# Patient Record
Sex: FEMALE | Race: BLACK OR AFRICAN AMERICAN | NOT HISPANIC OR LATINO | Employment: OTHER | URBAN - METROPOLITAN AREA
[De-identification: names, ages, dates, MRNs, and addresses within clinical notes are randomized per-mention and may not be internally consistent; named-entity substitution may affect disease eponyms.]

---

## 2017-07-26 PROBLEM — K13.79 PAIN IN ORAL CAVITY: Status: ACTIVE | Noted: 2017-07-26

## 2017-07-28 PROBLEM — G89.29 CHRONIC PAIN: Status: ACTIVE | Noted: 2017-07-28

## 2017-07-28 PROBLEM — Z79.4 TYPE 2 DIABETES MELLITUS WITH COMPLICATION, WITH LONG-TERM CURRENT USE OF INSULIN: Status: ACTIVE | Noted: 2017-07-28

## 2017-07-28 PROBLEM — I10 ESSENTIAL HYPERTENSION: Status: ACTIVE | Noted: 2017-07-28

## 2017-07-28 PROBLEM — I25.2 HISTORY OF MI (MYOCARDIAL INFARCTION): Status: ACTIVE | Noted: 2017-07-28

## 2017-07-28 PROBLEM — E11.8 TYPE 2 DIABETES MELLITUS WITH COMPLICATION, WITH LONG-TERM CURRENT USE OF INSULIN: Status: ACTIVE | Noted: 2017-07-28

## 2017-09-07 PROBLEM — E78.5 HYPERLIPIDEMIA: Status: ACTIVE | Noted: 2017-09-07

## 2017-09-07 PROBLEM — N18.30 CKD (CHRONIC KIDNEY DISEASE) STAGE 3, GFR 30-59 ML/MIN: Status: ACTIVE | Noted: 2017-09-07

## 2017-09-07 PROBLEM — E87.6 HYPOKALEMIA: Status: ACTIVE | Noted: 2017-09-07

## 2017-09-16 ENCOUNTER — NURSE TRIAGE (OUTPATIENT)
Dept: ADMINISTRATIVE | Facility: CLINIC | Age: 62
End: 2017-09-16

## 2017-09-16 NOTE — TELEPHONE ENCOUNTER
"    Reason for Disposition   Patient sounds very sick or weak to the triager    Answer Assessment - Initial Assessment Questions  1. ONSET: "When did the pain start?"       Monday   2. LOCATION: "Where is the pain located?"       Left ankle  3. PAIN: "How bad is the pain?"    (Scale 1-10; or mild, moderate, severe)   - MILD (1-3): doesn't interfere with normal activities    - MODERATE (4-7): interferes with normal activities (e.g., work or school) or awakens from sleep, limping    - SEVERE (8-10): excruciating pain, unable to do any normal activities, unable to walk       7/10  4. WORK OR EXERCISE: "Has there been any recent work or exercise that involved this part of the body?"       Fall   5. CAUSE: "What do you think is causing the ankle pain?"      Fall 2 days ago  6. OTHER SYMPTOMS: "Do you have any other symptoms?" (e.g., calf pain, rash, fever, swelling)      Swelling of the left ankle  7. PREGNANCY: "Is there any chance you are pregnant?" "When was your last menstrual period?"      N/a    Protocols used: ST ANKLE PAIN-A-AH    Patient fell Monday past and today she is experiencing moderate to severe pain of the left ankle. She states she went to the ED on Monday and they told her it was not broken and to rest it and they wrapped it. Patient states she has been walking on it and it is very swollen. Patient advised to call someone and have them to bring her to the ED for an evaluation. Patient states she will have to find a ride but unsure if she will go. Patient advised to f/u with Dr. Torres as instructed by the ED as she reports, but she states she could not get an appt until next month. Patient informed a message would be sent to Dr. Torres to see if she could be seen earlier. Patient verbalized understanding.   "

## 2018-04-25 ENCOUNTER — TELEPHONE (OUTPATIENT)
Dept: ADMINISTRATIVE | Facility: HOSPITAL | Age: 63
End: 2018-04-25

## 2020-07-02 ENCOUNTER — OFFICE VISIT (OUTPATIENT)
Dept: INTERNAL MEDICINE | Facility: CLINIC | Age: 65
End: 2020-07-02
Payer: MEDICARE

## 2020-07-02 ENCOUNTER — TELEPHONE (OUTPATIENT)
Dept: INTERNAL MEDICINE | Facility: CLINIC | Age: 65
End: 2020-07-02

## 2020-07-02 ENCOUNTER — LAB VISIT (OUTPATIENT)
Dept: LAB | Facility: HOSPITAL | Age: 65
End: 2020-07-02
Attending: FAMILY MEDICINE
Payer: MEDICARE

## 2020-07-02 VITALS
HEART RATE: 86 BPM | DIASTOLIC BLOOD PRESSURE: 80 MMHG | BODY MASS INDEX: 41.46 KG/M2 | HEIGHT: 67 IN | WEIGHT: 264.13 LBS | SYSTOLIC BLOOD PRESSURE: 139 MMHG | TEMPERATURE: 98 F

## 2020-07-02 DIAGNOSIS — E78.5 DYSLIPIDEMIA ASSOCIATED WITH TYPE 2 DIABETES MELLITUS: ICD-10-CM

## 2020-07-02 DIAGNOSIS — E55.9 VITAMIN D INSUFFICIENCY: ICD-10-CM

## 2020-07-02 DIAGNOSIS — I50.9 CONGESTIVE HEART FAILURE, UNSPECIFIED HF CHRONICITY, UNSPECIFIED HEART FAILURE TYPE: ICD-10-CM

## 2020-07-02 DIAGNOSIS — I25.10 CORONARY ARTERY DISEASE, ANGINA PRESENCE UNSPECIFIED, UNSPECIFIED VESSEL OR LESION TYPE, UNSPECIFIED WHETHER NATIVE OR TRANSPLANTED HEART: ICD-10-CM

## 2020-07-02 DIAGNOSIS — J44.9 CHRONIC OBSTRUCTIVE PULMONARY DISEASE, UNSPECIFIED COPD TYPE: ICD-10-CM

## 2020-07-02 DIAGNOSIS — Z79.4 TYPE 2 DIABETES MELLITUS WITH COMPLICATION, WITH LONG-TERM CURRENT USE OF INSULIN: ICD-10-CM

## 2020-07-02 DIAGNOSIS — E11.69 DYSLIPIDEMIA ASSOCIATED WITH TYPE 2 DIABETES MELLITUS: ICD-10-CM

## 2020-07-02 DIAGNOSIS — R35.0 URINARY FREQUENCY: ICD-10-CM

## 2020-07-02 DIAGNOSIS — E11.8 TYPE 2 DIABETES MELLITUS WITH COMPLICATION, WITH LONG-TERM CURRENT USE OF INSULIN: ICD-10-CM

## 2020-07-02 DIAGNOSIS — Z79.4 TYPE 2 DIABETES MELLITUS WITH COMPLICATION, WITH LONG-TERM CURRENT USE OF INSULIN: Primary | ICD-10-CM

## 2020-07-02 DIAGNOSIS — I10 HYPERTENSION, UNSPECIFIED TYPE: ICD-10-CM

## 2020-07-02 DIAGNOSIS — N18.30 CKD (CHRONIC KIDNEY DISEASE) STAGE 3, GFR 30-59 ML/MIN: ICD-10-CM

## 2020-07-02 DIAGNOSIS — E66.01 MORBID OBESITY: ICD-10-CM

## 2020-07-02 DIAGNOSIS — G89.29 CHRONIC BACK PAIN, UNSPECIFIED BACK LOCATION, UNSPECIFIED BACK PAIN LATERALITY: ICD-10-CM

## 2020-07-02 DIAGNOSIS — G89.29 OTHER CHRONIC PAIN: ICD-10-CM

## 2020-07-02 DIAGNOSIS — M54.9 CHRONIC BACK PAIN, UNSPECIFIED BACK LOCATION, UNSPECIFIED BACK PAIN LATERALITY: ICD-10-CM

## 2020-07-02 DIAGNOSIS — E11.8 TYPE 2 DIABETES MELLITUS WITH COMPLICATION, WITH LONG-TERM CURRENT USE OF INSULIN: Primary | ICD-10-CM

## 2020-07-02 DIAGNOSIS — J44.9 CHRONIC OBSTRUCTIVE PULMONARY DISEASE, UNSPECIFIED COPD TYPE: Primary | ICD-10-CM

## 2020-07-02 LAB
ALBUMIN SERPL BCP-MCNC: 3.6 G/DL (ref 3.5–5.2)
ALP SERPL-CCNC: 69 U/L (ref 55–135)
ALT SERPL W/O P-5'-P-CCNC: 10 U/L (ref 10–44)
ANION GAP SERPL CALC-SCNC: 10 MMOL/L (ref 8–16)
AST SERPL-CCNC: 19 U/L (ref 10–40)
BASOPHILS # BLD AUTO: 0.06 K/UL (ref 0–0.2)
BASOPHILS NFR BLD: 0.5 % (ref 0–1.9)
BILIRUB SERPL-MCNC: 0.6 MG/DL (ref 0.1–1)
BUN SERPL-MCNC: 21 MG/DL (ref 8–23)
CALCIUM SERPL-MCNC: 10.1 MG/DL (ref 8.7–10.5)
CHLORIDE SERPL-SCNC: 99 MMOL/L (ref 95–110)
CO2 SERPL-SCNC: 28 MMOL/L (ref 23–29)
CREAT SERPL-MCNC: 1.1 MG/DL (ref 0.5–1.4)
DIFFERENTIAL METHOD: ABNORMAL
EOSINOPHIL # BLD AUTO: 0.5 K/UL (ref 0–0.5)
EOSINOPHIL NFR BLD: 4.3 % (ref 0–8)
ERYTHROCYTE [DISTWIDTH] IN BLOOD BY AUTOMATED COUNT: 13.2 % (ref 11.5–14.5)
EST. GFR  (AFRICAN AMERICAN): >60 ML/MIN/1.73 M^2
EST. GFR  (NON AFRICAN AMERICAN): 53.2 ML/MIN/1.73 M^2
GLUCOSE SERPL-MCNC: 87 MG/DL (ref 70–110)
HCT VFR BLD AUTO: 45.8 % (ref 37–48.5)
HGB BLD-MCNC: 14.6 G/DL (ref 12–16)
IMM GRANULOCYTES # BLD AUTO: 0.05 K/UL (ref 0–0.04)
IMM GRANULOCYTES NFR BLD AUTO: 0.4 % (ref 0–0.5)
LYMPHOCYTES # BLD AUTO: 3 K/UL (ref 1–4.8)
LYMPHOCYTES NFR BLD: 23.9 % (ref 18–48)
MCH RBC QN AUTO: 30.3 PG (ref 27–31)
MCHC RBC AUTO-ENTMCNC: 31.9 G/DL (ref 32–36)
MCV RBC AUTO: 95 FL (ref 82–98)
MONOCYTES # BLD AUTO: 1 K/UL (ref 0.3–1)
MONOCYTES NFR BLD: 7.8 % (ref 4–15)
NEUTROPHILS # BLD AUTO: 7.9 K/UL (ref 1.8–7.7)
NEUTROPHILS NFR BLD: 63.1 % (ref 38–73)
NRBC BLD-RTO: 0 /100 WBC
PLATELET # BLD AUTO: 261 K/UL (ref 150–350)
PMV BLD AUTO: 11.4 FL (ref 9.2–12.9)
POTASSIUM SERPL-SCNC: 3.9 MMOL/L (ref 3.5–5.1)
PROT SERPL-MCNC: 8.5 G/DL (ref 6–8.4)
RBC # BLD AUTO: 4.82 M/UL (ref 4–5.4)
SODIUM SERPL-SCNC: 137 MMOL/L (ref 136–145)
TSH SERPL DL<=0.005 MIU/L-ACNC: 1.17 UIU/ML (ref 0.4–4)
WBC # BLD AUTO: 12.51 K/UL (ref 3.9–12.7)

## 2020-07-02 PROCEDURE — 3008F PR BODY MASS INDEX (BMI) DOCUMENTED: ICD-10-PCS | Mod: CPTII,S$GLB,, | Performed by: FAMILY MEDICINE

## 2020-07-02 PROCEDURE — 99215 PR OFFICE/OUTPT VISIT, EST, LEVL V, 40-54 MIN: ICD-10-PCS | Mod: S$GLB,,, | Performed by: FAMILY MEDICINE

## 2020-07-02 PROCEDURE — 3075F PR MOST RECENT SYSTOLIC BLOOD PRESS GE 130-139MM HG: ICD-10-PCS | Mod: CPTII,S$GLB,, | Performed by: FAMILY MEDICINE

## 2020-07-02 PROCEDURE — 3079F DIAST BP 80-89 MM HG: CPT | Mod: CPTII,S$GLB,, | Performed by: FAMILY MEDICINE

## 2020-07-02 PROCEDURE — 82043 UR ALBUMIN QUANTITATIVE: CPT

## 2020-07-02 PROCEDURE — 36415 COLL VENOUS BLD VENIPUNCTURE: CPT | Mod: PO

## 2020-07-02 PROCEDURE — 3046F HEMOGLOBIN A1C LEVEL >9.0%: CPT | Mod: CPTII,S$GLB,, | Performed by: FAMILY MEDICINE

## 2020-07-02 PROCEDURE — 99215 OFFICE O/P EST HI 40 MIN: CPT | Mod: S$GLB,,, | Performed by: FAMILY MEDICINE

## 2020-07-02 PROCEDURE — 3075F SYST BP GE 130 - 139MM HG: CPT | Mod: CPTII,S$GLB,, | Performed by: FAMILY MEDICINE

## 2020-07-02 PROCEDURE — 87086 URINE CULTURE/COLONY COUNT: CPT

## 2020-07-02 PROCEDURE — 82306 VITAMIN D 25 HYDROXY: CPT

## 2020-07-02 PROCEDURE — 3008F BODY MASS INDEX DOCD: CPT | Mod: CPTII,S$GLB,, | Performed by: FAMILY MEDICINE

## 2020-07-02 PROCEDURE — 3079F PR MOST RECENT DIASTOLIC BLOOD PRESSURE 80-89 MM HG: ICD-10-PCS | Mod: CPTII,S$GLB,, | Performed by: FAMILY MEDICINE

## 2020-07-02 PROCEDURE — 99999 PR PBB SHADOW E&M-EST. PATIENT-LVL V: ICD-10-PCS | Mod: PBBFAC,,, | Performed by: FAMILY MEDICINE

## 2020-07-02 PROCEDURE — 81001 URINALYSIS AUTO W/SCOPE: CPT

## 2020-07-02 PROCEDURE — 85025 COMPLETE CBC W/AUTO DIFF WBC: CPT

## 2020-07-02 PROCEDURE — 3046F PR MOST RECENT HEMOGLOBIN A1C LEVEL > 9.0%: ICD-10-PCS | Mod: CPTII,S$GLB,, | Performed by: FAMILY MEDICINE

## 2020-07-02 PROCEDURE — 99999 PR PBB SHADOW E&M-EST. PATIENT-LVL V: CPT | Mod: PBBFAC,,, | Performed by: FAMILY MEDICINE

## 2020-07-02 PROCEDURE — 84443 ASSAY THYROID STIM HORMONE: CPT

## 2020-07-02 PROCEDURE — 83036 HEMOGLOBIN GLYCOSYLATED A1C: CPT

## 2020-07-02 PROCEDURE — 80053 COMPREHEN METABOLIC PANEL: CPT

## 2020-07-02 RX ORDER — PANTOPRAZOLE SODIUM 40 MG/1
40 TABLET, DELAYED RELEASE ORAL DAILY
COMMUNITY
End: 2020-07-02 | Stop reason: ALTCHOICE

## 2020-07-02 RX ORDER — FLUTICASONE FUROATE AND VILANTEROL 100; 25 UG/1; UG/1
1 POWDER RESPIRATORY (INHALATION)
COMMUNITY
Start: 2020-05-18 | End: 2020-07-02 | Stop reason: SDUPTHER

## 2020-07-02 RX ORDER — CARVEDILOL 12.5 MG/1
12.5 TABLET ORAL 2 TIMES DAILY WITH MEALS
COMMUNITY
End: 2020-07-02 | Stop reason: SDUPTHER

## 2020-07-02 RX ORDER — INSULIN LISPRO 100 [IU]/ML
10 INJECTION, SOLUTION INTRAVENOUS; SUBCUTANEOUS 3 TIMES DAILY
COMMUNITY

## 2020-07-02 RX ORDER — MUPIROCIN 20 MG/G
OINTMENT TOPICAL 3 TIMES DAILY
COMMUNITY
End: 2020-07-02 | Stop reason: SDUPTHER

## 2020-07-02 RX ORDER — CHOLECALCIFEROL (VITAMIN D3) 25 MCG
2000 TABLET ORAL DAILY
COMMUNITY

## 2020-07-02 RX ORDER — FLUCONAZOLE 150 MG/1
150 TABLET ORAL DAILY
Qty: 1 TABLET | Refills: 0 | Status: SHIPPED | OUTPATIENT
Start: 2020-07-02 | End: 2020-07-03

## 2020-07-02 RX ORDER — ALBUTEROL SULFATE 90 UG/1
2 AEROSOL, METERED RESPIRATORY (INHALATION) EVERY 4 HOURS PRN
COMMUNITY
Start: 2020-03-16 | End: 2020-07-29 | Stop reason: SDUPTHER

## 2020-07-02 RX ORDER — MUPIROCIN 20 MG/G
OINTMENT TOPICAL 3 TIMES DAILY
Qty: 30 G | Refills: 0 | Status: SHIPPED | OUTPATIENT
Start: 2020-07-02 | End: 2020-08-04

## 2020-07-02 RX ORDER — CARVEDILOL 12.5 MG/1
12.5 TABLET ORAL 2 TIMES DAILY
COMMUNITY
Start: 2020-05-18 | End: 2020-07-17 | Stop reason: ALTCHOICE

## 2020-07-02 RX ORDER — FLUTICASONE FUROATE AND VILANTEROL 100; 25 UG/1; UG/1
1 POWDER RESPIRATORY (INHALATION) DAILY
Qty: 30 EACH | Refills: 11 | Status: SHIPPED | OUTPATIENT
Start: 2020-07-02 | End: 2020-09-24 | Stop reason: SDUPTHER

## 2020-07-02 RX ORDER — NYSTATIN 100000 U/G
CREAM TOPICAL 2 TIMES DAILY
Qty: 30 G | Refills: 0 | Status: SHIPPED | OUTPATIENT
Start: 2020-07-02

## 2020-07-02 RX ORDER — POTASSIUM CHLORIDE 20 MEQ/1
20 TABLET, EXTENDED RELEASE ORAL DAILY
COMMUNITY
End: 2020-11-04

## 2020-07-02 RX ORDER — INSULIN GLARGINE 100 [IU]/ML
50 INJECTION, SOLUTION SUBCUTANEOUS NIGHTLY
COMMUNITY
End: 2020-07-02

## 2020-07-02 RX ORDER — TORSEMIDE 20 MG/1
20 TABLET ORAL DAILY
COMMUNITY
End: 2020-07-17 | Stop reason: SDUPTHER

## 2020-07-02 NOTE — PROGRESS NOTES
"Subjective:      Patient ID: Marley Malagon is a 64 y.o. female.    Chief Complaint: Establish Care (hospital f/u/  [facility] )    HPI  65 yo with a complicated medical history here to establish care.  Was hospitalized for CHF in May at . E//I can see part of those records.  She didn't f/u with Cards//had some med changes  She had hep A as well  She is on chronic oxygen, reports that she had a Pulm doctor in [facility] where she was living.  Has COPD//never been a smoker  Has hx of stent placed in heart last year she thinks.  She is on ASA  Has GERD/on PPI  Has chronic pain, in her back/joints.  Was seeing pain management in [facility].  Can't afford to go there.  Trouble with transportation.    Doesn't drive  Having likely yeast infection/wants her urine checked  BG is running high, 200-400s.  She takes Lantus and Humalog but her med list doesn't show this.    Past Medical History:   Diagnosis Date    Arthritis     COPD (chronic obstructive pulmonary disease)     Diabetes mellitus, type 2     Heart attack 2005    Hypertension     ELAINA (obstructive sleep apnea)      Family History   Problem Relation Age of Onset    Diabetes Mother     Kidney disease Mother     Alcohol abuse Father     Breast cancer Maternal Aunt      Past Surgical History:   Procedure Laterality Date    cardiac stent      CHOLECYSTECTOMY      HYSTERECTOMY      fibroids    OOPHORECTOMY       Social History     Tobacco Use    Smoking status: Never Smoker    Smokeless tobacco: Never Used   Substance Use Topics    Alcohol use: No     Alcohol/week: 0.0 standard drinks    Drug use: No       /80 (BP Location: Left arm, Patient Position: Sitting, BP Method: X-Large (Manual))   Pulse 86   Temp 98.1 °F (36.7 °C) (Temporal)   Ht 5' 7" (1.702 m)   Wt 119.8 kg (264 lb 1.8 oz)   BMI 41.37 kg/m²     Review of Systems   Constitutional: Positive for activity change. Negative for appetite change, chills, diaphoresis, fatigue, fever and " unexpected weight change.   HENT: Negative for ear pain, hearing loss, postnasal drip, rhinorrhea and tinnitus.    Eyes: Negative for visual disturbance.   Respiratory: Negative for cough and wheezing.    Cardiovascular: Negative for chest pain, palpitations and leg swelling.   Gastrointestinal: Negative for abdominal distention and abdominal pain.   Genitourinary: Positive for dysuria and vaginal discharge. Negative for frequency, hematuria and urgency.        Vaginal itching//not sexually active   Musculoskeletal: Positive for arthralgias and back pain.   Neurological: Negative for weakness and headaches.   Hematological: Negative for adenopathy.   Psychiatric/Behavioral: Negative for confusion and decreased concentration.       Objective:     Physical Exam  Vitals signs and nursing note reviewed.   Constitutional:       General: She is not in acute distress.     Appearance: She is well-developed. She is obese.   HENT:      Right Ear: External ear normal.      Left Ear: External ear normal.      Nose: Nose normal.   Eyes:      Conjunctiva/sclera: Conjunctivae normal.      Pupils: Pupils are equal, round, and reactive to light.   Neck:      Musculoskeletal: Normal range of motion and neck supple.      Thyroid: No thyromegaly.      Vascular: No carotid bruit.   Cardiovascular:      Rate and Rhythm: Normal rate and regular rhythm.      Heart sounds: Normal heart sounds.   Pulmonary:      Effort: Pulmonary effort is normal. No respiratory distress.      Breath sounds: Normal breath sounds. No wheezing or rales.      Comments: Oxygen nasal cannula in place  Abdominal:      General: Bowel sounds are normal. There is no distension.      Palpations: Abdomen is soft.      Tenderness: There is no abdominal tenderness. There is no guarding.   Musculoskeletal:      Right lower leg: No edema.      Left lower leg: No edema.   Lymphadenopathy:      Cervical: No cervical adenopathy.   Skin:     General: Skin is warm and dry.       Findings: No rash.   Neurological:      Mental Status: She is alert and oriented to person, place, and time.      Cranial Nerves: No cranial nerve deficit.   Psychiatric:         Behavior: Behavior normal.         Thought Content: Thought content normal.         Judgment: Judgment normal.         Lab Results   Component Value Date    WBC 11.26 08/22/2017    HGB 14.1 08/22/2017    HCT 42.0 08/22/2017     08/22/2017    CHOL 80 (L) 08/22/2017    TRIG 162 (H) 08/22/2017    HDL 34 (L) 08/22/2017    ALT 13 01/23/2018    AST 21 01/23/2018     (L) 01/23/2018    K 3.5 01/23/2018    CL 95 01/23/2018    CREATININE 1.1 01/23/2018    BUN 17 01/23/2018    CO2 25 01/23/2018    TSH 1.830 08/22/2017    HGBA1C 11.1 (H) 03/08/2020       Assessment:     1. Type 2 diabetes mellitus with complication, with long-term current use of insulin    2. CKD (chronic kidney disease) stage 3, GFR 30-59 ml/min    3. Dyslipidemia associated with type 2 diabetes mellitus    4. Coronary artery disease, angina presence unspecified, unspecified vessel or lesion type, unspecified whether native or transplanted heart    5. Congestive heart failure, unspecified HF chronicity, unspecified heart failure type    6. Chronic obstructive pulmonary disease, unspecified COPD type    7. Vitamin D insufficiency    8. Hypertension, unspecified type    9. Morbid obesity    10. Urinary frequency    11. Other chronic pain    12. Chronic back pain, unspecified back location, unspecified back pain laterality         Plan:     Type 2 diabetes mellitus with complication, with long-term current use of insulin  -     Hemoglobin A1C; Future; Expected date: 07/02/2020  -     Microalbumin/creatinine urine ratio; Future; Expected date: 07/02/2020    CKD (chronic kidney disease) stage 3, GFR 30-59 ml/min  -     Comprehensive metabolic panel; Future; Expected date: 07/02/2020    Dyslipidemia associated with type 2 diabetes mellitus  -     TSH; Future; Expected date:  07/02/2020    Coronary artery disease, angina presence unspecified, unspecified vessel or lesion type, unspecified whether native or transplanted heart  -     Ambulatory referral/consult to Cardiology; Future; Expected date: 07/09/2020    Congestive heart failure, unspecified HF chronicity, unspecified heart failure type  -     Ambulatory referral/consult to Cardiology; Future; Expected date: 07/09/2020    Chronic obstructive pulmonary disease, unspecified COPD type  -     Cancel: Ambulatory referral/consult to Pulmonology; Future; Expected date: 07/09/2020  -     Ambulatory referral/consult to Pulmonology; Future; Expected date: 07/09/2020    Vitamin D insufficiency  -     Vitamin D; Future; Expected date: 07/02/2020    Hypertension, unspecified type  -     CBC auto differential; Future; Expected date: 07/02/2020    Morbid obesity    Urinary frequency  -     URINALYSIS  -     Urine culture; Future; Expected date: 07/02/2020    Other chronic pain    Chronic back pain, unspecified back location, unspecified back pain laterality  -     Ambulatory referral/consult to Pain Clinic; Future; Expected date: 07/09/2020    Other orders  -     fluconazole (DIFLUCAN) 150 MG Tab; Take 1 tablet (150 mg total) by mouth once daily. for 1 day  Dispense: 1 tablet; Refill: 0  -     nystatin (MYCOSTATIN) cream; Apply topically 2 (two) times daily.  Dispense: 30 g; Refill: 0  -     fluticasone furoate-vilanteroL (BREO) 100-25 mcg/dose diskus inhaler; Inhale 1 puff into the lungs once daily.  Dispense: 30 each; Refill: 11  -     mupirocin (BACTROBAN) 2 % ointment; Apply topically 3 (three) times daily.  Dispense: 30 g; Refill: 0    Pt was all over during the visit.  Tried to review some old records  Spent 45 mins face to face  She was unsure about meds, I had to look thru all her bags.  Uncontrolled DM  Says she is taking lantus 20 units in AM and 50 units in PM  Using humalog as well  BP ok/cont meds  Update labs/check urine  Will send  diflucan and nystatin  Pt also needs Breo refilled  Needs to see Cards  Needs to see Pulm  Needs to see pain management  Will also likely need DM  Short term f/u

## 2020-07-02 NOTE — TELEPHONE ENCOUNTER
Called [facility] pharmacy #4, spoke with Pretty, she said, she do not have anyone in the system by that name. Called pt, left message for call back on voice mail.

## 2020-07-03 LAB
25(OH)D3+25(OH)D2 SERPL-MCNC: 42 NG/ML (ref 30–96)
ALBUMIN/CREAT UR: 16.9 UG/MG (ref 0–30)
AMORPH CRY UR QL COMP ASSIST: ABNORMAL
BACTERIA #/AREA URNS AUTO: ABNORMAL /HPF
BACTERIA UR CULT: NORMAL
BILIRUB UR QL STRIP: NEGATIVE
CLARITY UR REFRACT.AUTO: ABNORMAL
COLOR UR AUTO: YELLOW
CREAT UR-MCNC: 83 MG/DL (ref 15–325)
ESTIMATED AVG GLUCOSE: 272 MG/DL (ref 68–131)
GLUCOSE UR QL STRIP: ABNORMAL
HBA1C MFR BLD HPLC: 11.1 % (ref 4–5.6)
HGB UR QL STRIP: NEGATIVE
KETONES UR QL STRIP: NEGATIVE
LEUKOCYTE ESTERASE UR QL STRIP: ABNORMAL
MICROALBUMIN UR DL<=1MG/L-MCNC: 14 UG/ML
MICROSCOPIC COMMENT: ABNORMAL
NITRITE UR QL STRIP: NEGATIVE
PH UR STRIP: 5 [PH] (ref 5–8)
PROT UR QL STRIP: NEGATIVE
RBC #/AREA URNS AUTO: 2 /HPF (ref 0–4)
SP GR UR STRIP: 1.01 (ref 1–1.03)
SQUAMOUS #/AREA URNS AUTO: 5 /HPF
URN SPEC COLLECT METH UR: ABNORMAL
WBC #/AREA URNS AUTO: 5 /HPF (ref 0–5)
YEAST UR QL AUTO: ABNORMAL

## 2020-07-06 ENCOUNTER — PATIENT MESSAGE (OUTPATIENT)
Dept: INTERNAL MEDICINE | Facility: CLINIC | Age: 65
End: 2020-07-06

## 2020-07-06 DIAGNOSIS — E11.65 UNCONTROLLED TYPE 2 DIABETES MELLITUS WITH HYPERGLYCEMIA: Primary | ICD-10-CM

## 2020-07-06 NOTE — TELEPHONE ENCOUNTER
Sugars are extremely uncontrolled  Order for DM education in for pt//needs to see provider out here in [place]  We need to verify thru pharmacy her insulin names//doses please

## 2020-07-06 NOTE — TELEPHONE ENCOUNTER
Called pharmacy and they said that she filled Trulicity in March, and in April she filled Humalog Kwik pen 20 units TID and Lantus Solostar 50 units bid.  Appt out here for DM is in Sept.  We can put on wait list also.

## 2020-07-06 NOTE — TELEPHONE ENCOUNTER
Please update her med list  Advise her to take Lantus 50mg bid  Does she need Trulicity refilled?  Will need A1C check in 3mos before seeing me

## 2020-07-08 ENCOUNTER — NURSE TRIAGE (OUTPATIENT)
Dept: ADMINISTRATIVE | Facility: CLINIC | Age: 65
End: 2020-07-08

## 2020-07-08 NOTE — TELEPHONE ENCOUNTER
Pt sitting at front door. Breathing does not sound as severe but difficulty breathing still present at this time. EMS has not arrived.

## 2020-07-08 NOTE — TELEPHONE ENCOUNTER
EMS arrived at this time Triage nurse had verbal contact with EMS upon arrival to pt's home. Care assumed per EMS

## 2020-07-08 NOTE — TELEPHONE ENCOUNTER
Pt having difficulty breathing when called retrieved from intake. Pt states feeling sweaty and severe difficulty breathing. Pt confirms that she is home alone.  Telephone number confirmed by patient. EMS called per co worker and sent to pt's address as confirmed by patient and noted in Epic.. Pt advised to reserve energy and breathing. Pt trying to clean self and change clothing. Again advised to decrease activity to conserve energy. EMS enroute. Pt feeling a little better Breathing better at this time. Triage nurse remained on phone with patient.     Reason for Disposition   Severe difficulty breathing (e.g., struggling for each breath, speaks in single words)    Protocols used: BREATHING DIFFICULTY-A-AH

## 2020-07-13 NOTE — TELEPHONE ENCOUNTER
Patient just got discharge from hospital today.  Patient is with [facility].  She will do her hospital follow up with Dr. Andrews for Friday.

## 2020-07-17 ENCOUNTER — OFFICE VISIT (OUTPATIENT)
Dept: INTERNAL MEDICINE | Facility: CLINIC | Age: 65
End: 2020-07-17
Payer: MEDICARE

## 2020-07-17 VITALS
SYSTOLIC BLOOD PRESSURE: 132 MMHG | TEMPERATURE: 98 F | DIASTOLIC BLOOD PRESSURE: 70 MMHG | OXYGEN SATURATION: 89 % | HEIGHT: 67 IN | BODY MASS INDEX: 41.91 KG/M2 | WEIGHT: 267 LBS | HEART RATE: 87 BPM

## 2020-07-17 DIAGNOSIS — J18.9 PNEUMONIA DUE TO INFECTIOUS ORGANISM, UNSPECIFIED LATERALITY, UNSPECIFIED PART OF LUNG: Primary | ICD-10-CM

## 2020-07-17 DIAGNOSIS — I10 ESSENTIAL HYPERTENSION: ICD-10-CM

## 2020-07-17 DIAGNOSIS — R06.00 DYSPNEA, UNSPECIFIED TYPE: ICD-10-CM

## 2020-07-17 DIAGNOSIS — M19.90 ARTHRITIS: ICD-10-CM

## 2020-07-17 PROCEDURE — 99999 PR PBB SHADOW E&M-EST. PATIENT-LVL V: CPT | Mod: PBBFAC,,, | Performed by: FAMILY MEDICINE

## 2020-07-17 PROCEDURE — 3008F PR BODY MASS INDEX (BMI) DOCUMENTED: ICD-10-PCS | Mod: CPTII,S$GLB,, | Performed by: FAMILY MEDICINE

## 2020-07-17 PROCEDURE — 99999 PR PBB SHADOW E&M-EST. PATIENT-LVL V: ICD-10-PCS | Mod: PBBFAC,,, | Performed by: FAMILY MEDICINE

## 2020-07-17 PROCEDURE — 3078F DIAST BP <80 MM HG: CPT | Mod: CPTII,S$GLB,, | Performed by: FAMILY MEDICINE

## 2020-07-17 PROCEDURE — 99214 PR OFFICE/OUTPT VISIT, EST, LEVL IV, 30-39 MIN: ICD-10-PCS | Mod: S$GLB,,, | Performed by: FAMILY MEDICINE

## 2020-07-17 PROCEDURE — 3075F PR MOST RECENT SYSTOLIC BLOOD PRESS GE 130-139MM HG: ICD-10-PCS | Mod: CPTII,S$GLB,, | Performed by: FAMILY MEDICINE

## 2020-07-17 PROCEDURE — 3008F BODY MASS INDEX DOCD: CPT | Mod: CPTII,S$GLB,, | Performed by: FAMILY MEDICINE

## 2020-07-17 PROCEDURE — 99214 OFFICE O/P EST MOD 30 MIN: CPT | Mod: S$GLB,,, | Performed by: FAMILY MEDICINE

## 2020-07-17 PROCEDURE — 3075F SYST BP GE 130 - 139MM HG: CPT | Mod: CPTII,S$GLB,, | Performed by: FAMILY MEDICINE

## 2020-07-17 PROCEDURE — 3078F PR MOST RECENT DIASTOLIC BLOOD PRESSURE < 80 MM HG: ICD-10-PCS | Mod: CPTII,S$GLB,, | Performed by: FAMILY MEDICINE

## 2020-07-17 RX ORDER — TORSEMIDE 20 MG/1
20 TABLET ORAL DAILY
Qty: 30 TABLET | Refills: 0 | Status: SHIPPED | OUTPATIENT
Start: 2020-07-17 | End: 2020-09-18

## 2020-07-17 RX ORDER — CYCLOBENZAPRINE HCL 10 MG
10 TABLET ORAL DAILY PRN
COMMUNITY
End: 2020-08-04 | Stop reason: SDUPTHER

## 2020-07-17 RX ORDER — INSULIN GLARGINE 100 [IU]/ML
50 INJECTION, SOLUTION SUBCUTANEOUS
COMMUNITY

## 2020-07-17 RX ORDER — ISOSORBIDE DINITRATE AND HYDRALAZINE HYDROCHLORIDE 37.5; 2 MG/1; MG/1
1 TABLET ORAL
COMMUNITY
Start: 2020-07-11 | End: 2020-08-04 | Stop reason: SDUPTHER

## 2020-07-17 RX ORDER — HYDROCODONE BITARTRATE AND ACETAMINOPHEN 10; 325 MG/1; MG/1
1 TABLET ORAL 2 TIMES DAILY PRN
Qty: 20 TABLET | Refills: 0 | Status: CANCELLED | OUTPATIENT
Start: 2020-07-17

## 2020-07-17 RX ORDER — METOPROLOL SUCCINATE 100 MG/1
100 TABLET, EXTENDED RELEASE ORAL
COMMUNITY
End: 2020-10-12 | Stop reason: SDUPTHER

## 2020-07-17 NOTE — PROGRESS NOTES
Subjective:       Patient ID: Marley Malagon is a 64 y.o. female.    Chief Complaint: Hospital Follow Up (pt went to be evaluated for SOB and excessive sweating ) and Shortness of Breath (since this morning, difficulty breathing when getting dressed )    Oxygen sats in room at 74% on 3 L of oxygen.  Recently d/c from hospital for PNA    Shortness of Breath  This is a new problem. The current episode started in the past 7 days. The problem occurs constantly. The problem has been gradually worsening. Associated symptoms include wheezing. Pertinent negatives include no chest pain, fever, leg swelling, rash, syncope or vomiting. The symptoms are aggravated by any activity. Treatments tried: Oxygen.     Review of Systems   Constitutional: Negative for fever.   Respiratory: Positive for shortness of breath and wheezing.    Cardiovascular: Negative for chest pain, palpitations, leg swelling and syncope.   Gastrointestinal: Negative for vomiting.   Skin: Negative for rash.       Objective:      Physical Exam  Vitals signs and nursing note reviewed.   Constitutional:       General: She is in acute distress.      Appearance: Normal appearance. She is well-developed. She is not diaphoretic.   HENT:      Head: Normocephalic and atraumatic.      Nose: Nose normal.   Pulmonary:      Effort: Tachypnea and respiratory distress present.      Breath sounds: Normal breath sounds. Decreased air movement present.   Abdominal:      General: There is no distension.      Palpations: Abdomen is soft.      Tenderness: There is no abdominal tenderness. There is no guarding.   Skin:     General: Skin is warm and dry.      Findings: No erythema or rash.   Neurological:      Mental Status: She is alert.         Assessment:       1. Pneumonia due to infectious organism, unspecified laterality, unspecified part of lung    2. Dyspnea, unspecified type    3. Arthritis    4. Essential hypertension        Plan:     Problem List Items Addressed This  Visit        Pulmonary    Pneumonia due to infectious organism - Primary       Cardiac/Vascular    Essential hypertension    Relevant Medications    torsemide (DEMADEX) 20 MG Tab       Orthopedic    Arthritis    Relevant Orders    Ambulatory referral/consult to Pain Clinic       Other    Dyspnea    Current Assessment & Plan     Ref pt to ED for SOB - 74% while on 3 L Oxygen

## 2020-07-24 ENCOUNTER — TELEPHONE (OUTPATIENT)
Dept: PAIN MEDICINE | Facility: CLINIC | Age: 65
End: 2020-07-24

## 2020-07-27 ENCOUNTER — OFFICE VISIT (OUTPATIENT)
Dept: PAIN MEDICINE | Facility: CLINIC | Age: 65
End: 2020-07-27
Payer: MEDICARE

## 2020-07-27 ENCOUNTER — TELEPHONE (OUTPATIENT)
Dept: PAIN MEDICINE | Facility: CLINIC | Age: 65
End: 2020-07-27

## 2020-07-27 VITALS
BODY MASS INDEX: 41.14 KG/M2 | SYSTOLIC BLOOD PRESSURE: 134 MMHG | WEIGHT: 262.13 LBS | DIASTOLIC BLOOD PRESSURE: 73 MMHG | HEIGHT: 67 IN | HEART RATE: 66 BPM

## 2020-07-27 DIAGNOSIS — M19.90 ARTHRITIS: ICD-10-CM

## 2020-07-27 DIAGNOSIS — M47.812 CERVICAL SPONDYLOSIS: Primary | ICD-10-CM

## 2020-07-27 PROCEDURE — 3008F BODY MASS INDEX DOCD: CPT | Mod: CPTII,S$GLB,, | Performed by: PAIN MEDICINE

## 2020-07-27 PROCEDURE — 3008F PR BODY MASS INDEX (BMI) DOCUMENTED: ICD-10-PCS | Mod: CPTII,S$GLB,, | Performed by: PAIN MEDICINE

## 2020-07-27 PROCEDURE — 99999 PR PBB SHADOW E&M-EST. PATIENT-LVL V: ICD-10-PCS | Mod: PBBFAC,,, | Performed by: PAIN MEDICINE

## 2020-07-27 PROCEDURE — 3075F SYST BP GE 130 - 139MM HG: CPT | Mod: CPTII,S$GLB,, | Performed by: PAIN MEDICINE

## 2020-07-27 PROCEDURE — 3078F PR MOST RECENT DIASTOLIC BLOOD PRESSURE < 80 MM HG: ICD-10-PCS | Mod: CPTII,S$GLB,, | Performed by: PAIN MEDICINE

## 2020-07-27 PROCEDURE — 3078F DIAST BP <80 MM HG: CPT | Mod: CPTII,S$GLB,, | Performed by: PAIN MEDICINE

## 2020-07-27 PROCEDURE — 99204 PR OFFICE/OUTPT VISIT, NEW, LEVL IV, 45-59 MIN: ICD-10-PCS | Mod: S$GLB,,, | Performed by: PAIN MEDICINE

## 2020-07-27 PROCEDURE — 99999 PR PBB SHADOW E&M-EST. PATIENT-LVL V: CPT | Mod: PBBFAC,,, | Performed by: PAIN MEDICINE

## 2020-07-27 PROCEDURE — 3075F PR MOST RECENT SYSTOLIC BLOOD PRESS GE 130-139MM HG: ICD-10-PCS | Mod: CPTII,S$GLB,, | Performed by: PAIN MEDICINE

## 2020-07-27 PROCEDURE — 99204 OFFICE O/P NEW MOD 45 MIN: CPT | Mod: S$GLB,,, | Performed by: PAIN MEDICINE

## 2020-07-27 RX ORDER — HYDROCODONE BITARTRATE AND ACETAMINOPHEN 10; 325 MG/1; MG/1
1 TABLET ORAL 2 TIMES DAILY PRN
Qty: 60 TABLET | Refills: 0 | Status: SHIPPED | OUTPATIENT
Start: 2020-07-27 | End: 2020-08-04

## 2020-07-27 RX ORDER — PROMETHAZINE HYDROCHLORIDE 25 MG/1
25 TABLET ORAL EVERY 6 HOURS PRN
COMMUNITY

## 2020-07-27 RX ORDER — HYDROCODONE BITARTRATE AND ACETAMINOPHEN 10; 325 MG/1; MG/1
1 TABLET ORAL EVERY 12 HOURS PRN
Qty: 60 TABLET | Refills: 0 | Status: SHIPPED | OUTPATIENT
Start: 2020-08-25 | End: 2020-08-04

## 2020-07-27 RX ORDER — FUROSEMIDE 40 MG/1
40 TABLET ORAL
COMMUNITY
Start: 2020-07-21 | End: 2020-09-17 | Stop reason: SDUPTHER

## 2020-07-27 RX ORDER — METHYLPREDNISOLONE 4 MG/1
TABLET ORAL
COMMUNITY
Start: 2020-07-21 | End: 2020-07-28

## 2020-07-27 NOTE — TELEPHONE ENCOUNTER
----- Message from Renetta Forte sent at 7/27/2020 11:32 AM CDT -----  .Type:  Needs Medical Advice    Who Called: SEDA OWOTEN  Symptoms (please be specific):  How long has patient had these symptoms:   Pharmacy name and phone #:   Would the patient rather a call back or a response via [facility]? Call   Best Call Back Number: [phone] (home)    Additional Information:    Pt is requesting a call back from the nurse in regards to the pt med not being at her pharmacy please

## 2020-07-27 NOTE — TELEPHONE ENCOUNTER
RX is at pharmacy pt id has a different last name than insurance card pt will contact pharmacy and see what she has to do//dp

## 2020-07-27 NOTE — PROGRESS NOTES
Chief Pain Complaint:  Neck Pain and Low-back Pain    This note was created using voice recognition, there may be errors that were missed during proofreading.    History of Present Illness:   This patient is a 64 y.o. female who presents today complaining of the above noted pain/s. The patient describes the pain as follows.  Ms. Malagon is new patient clinic with complaints of neck and low back pain.  She reports having had these symptoms for approximately 2 years with no known inciting event.  Today she rates her neck pain as a 10/10 in her low back pain is rated as a 9/10.  She describes aching, throbbing, stabbing sensation which is worse with movement and standing improved with medications and rest.  She has tried numerous medications including ibuprofen, Lyrica, Lortab, Flexeril, Mobic.  She has also used a heating pad which has not been helpful.  She reports having arthritis from the neck all the way down to her low back and has previously seen an outside pain doctor however she reports that she was having to get someone to drive for in is 2 4 to drive and too expensive for her to continue going to that clinic.  They had maintained her with muscle relaxers and Lortab which she takes Lortab approximately 1-2 tablets per day of the 10/325 mg prescriptions.  She has had a lumbar epidural steroid injections several years ago which she did find to be helpful.  She has a referral for physical therapy which she will be starting in the next several days.  She endorses having numbness in her hands bilaterally almost of her pain is located in the cervical spine.  She is accompanied with a friend today to has endorse having had lumbar radiofrequency ablation in the past and how helpful this has been for her.    Previous Therapy:  Medications:  Motrin, Lyrica, hydrocodone, Flexeril  Injections: Lumbar ANDREA  Surgeries: None  Physical Therapy: None         Past Surgical History:   Procedure Laterality Date    cardiac stent       CHOLECYSTECTOMY      HYSTERECTOMY      fibroids    OOPHORECTOMY       Family History   Problem Relation Age of Onset    Diabetes Mother     Kidney disease Mother     Alcohol abuse Father     Breast cancer Maternal Aunt      Social History     Socioeconomic History    Marital status:      Spouse name: Not on file    Number of children: Not on file    Years of education: 11    Highest education level: Not on file   Occupational History    Not on file   Social Needs    Financial resource strain: Not on file    Food insecurity     Worry: Not on file     Inability: Not on file    Transportation needs     Medical: Not on file     Non-medical: Not on file   Tobacco Use    Smoking status: Never Smoker    Smokeless tobacco: Never Used   Substance and Sexual Activity    Alcohol use: No     Alcohol/week: 0.0 standard drinks    Drug use: No    Sexual activity: Never     Birth control/protection: Surgical   Lifestyle    Physical activity     Days per week: Not on file     Minutes per session: Not on file    Stress: Not on file   Relationships    Social connections     Talks on phone: Not on file     Gets together: Not on file     Attends [employer] service: Not on file     Active member of club or organization: Not on file     Attends meetings of clubs or organizations: Not on file     Relationship status: Not on file   Other Topics Concern    Are you pregnant or think you may be? Not Asked    Breast-feeding Not Asked   Social History Narrative    Not on file     Imaging / Labs / Studies (reviewed on 7/27/2020):    Review of Systems:  Review of Systems   Constitutional: Negative for fever.   Eyes: Negative for blurred vision.   Respiratory: Negative for cough and wheezing.    Cardiovascular: Negative for chest pain and orthopnea.   Gastrointestinal: Negative for constipation, diarrhea, nausea and vomiting.   Genitourinary: Negative for dysuria.   Musculoskeletal: Positive for neck pain.  "  Skin: Negative for itching and rash.   Endo/Heme/Allergies: Does not bruise/bleed easily.       Physical Exam:  /73   Pulse 66   Ht 5' 7" (1.702 m)   Wt 118.9 kg (262 lb 2 oz)   BMI 41.05 kg/m²  (reviewed on 7/27/2020)\  General    Constitutional: She is oriented to person, place, and time. She appears well-developed and well-nourished.   HENT:   Head: Normocephalic and atraumatic.   Eyes: EOM are normal.   Neck: Neck supple.   Pulmonary/Chest: Effort normal.   Abdominal: She exhibits no distension.   Neurological: She is alert and oriented to person, place, and time. No cranial nerve deficit.   Psychiatric: She has a normal mood and affect.     General Musculoskeletal Exam   Gait: normal     Back (L-Spine & T-Spine) / Neck (C-Spine) Exam     Tenderness Right paramedian tenderness of the Lower C-Spine. Left paramedian tenderness of the Lower C-Spine.     Neck (C-Spine) Range of Motion   Flexion:     Normal  Extension: Normal    Spinal Sensation   Right Side Sensation  C-Spine Level: normal   Left Side Sensation  C-Spine Level: normal    Neck (C-Spine) Tests   Spurling's Test   Left:  Negative  Right: negative    Comments:  Minimal tenderness to palpation over lower cervical facets and paraspinous muscles bilaterally; negative Spurling's bilaterally for radicular symptoms; positive increase in pain with flexion extension and left right lateral rotation; sensation intact to light touch bilateral upper extremities      Muscle Strength   Right Upper Extremity   Biceps: 5/5/5   Deltoid:  5/5  Triceps:  5/5  Left Upper Extremity  Biceps: 5/5/5   Deltoid:  5/5  Triceps:  5/5    Reflexes     Left Side  Biceps:  2+  Triceps:  2+  Brachioradialis:  2+    Right Side   Biceps:  2+  Triceps:  2+  Brachioradialis:  2+      Assessment  Cervical Spondylosis  Cervical Radiculopathy    1. 64 y.o. year old patient with PMH of   Past Medical History:   Diagnosis Date    Arthritis     COPD (chronic obstructive pulmonary " disease)     Diabetes mellitus, type 2     Heart attack 2005    Hypertension     ELAINA (obstructive sleep apnea)       presenting with pain located cervical spine  2. Pain Generators / Etiology: Cervical Radiculopathy and Cervical Spondylosis  3. Failed Meds (E- Effective, NE- Not Effective):  Hydrocodone-effective  4. Physical Therapy - None  5. Psychological comorbidities - None  6. Anticoagulants / Antiplatelets: None     PLAN:  1. Medications:  Will prescribe Lortab 10/325 mg tablets to be taken twice daily as needed for total of 60 tablets per month; have provided the 2nd prescription which cannot be filled until late August; patient signed pain contract today and also provided urine drug screen sample; [facility] checked  2. PT - patient has physical therapy referral which will start in the next several days  3. Psychological - none  4. Labs - obtain  none  5. Imaging - obtain cervical spine x-ray  6. Interventions - schedule none; can consider cervical medial branch blocks in the future  7. Referrals - none  8. Records - none  9. Follow up visit - follow up in clinic in 8 weeks  10. Patient Questions - answered all of the patient's questions regarding diagnosis, therapy, and treatment  11.  This condition does not require this patient to take time off of work    SHADY Hahn MD  Interventional Pain  [facility] [place]

## 2020-07-27 NOTE — PATIENT INSTRUCTIONS
-will obtain cervical spine x-ray today  -patient will start physical therapy the next few days  -will prescribe hydrocodone 10/325 mg tablets 2 times daily as needed total number 60 tablets  -patient provided urine drug screen sample today and signed a pain contract  -will have patient follow up in clinic in 8 weeks

## 2020-07-27 NOTE — LETTER
July 27, 2020      Kulwinder Andrews MD  [address] [address] [address]           [facility]  [address] [facility] [address]  Phone: [phone]  Fax: [phone]          Patient: Marley Malagon   MR Number: [id number]   YOB: 1955   Date of Visit: 7/27/2020       Dear Dr. Kulwinder Andrews:    Thank you for referring Marley Malagon to me for evaluation. Attached you will find relevant portions of my assessment and plan of care.    If you have questions, please do not hesitate to call me. I look forward to following Marley Malagon along with you.    Sincerely,    Janes Hahn MD    Enclosure  CC:  No Recipients    If you would like to receive this communication electronically, please contact [email or web address] or [phone] to request more information on [employer] [facility] access.    For providers and/or their staff who would like to refer a patient to [facility], please contact us through our one-stop-shop provider referral line, [facility] , at [phone].    If you feel you have received this communication in error or would no longer like to receive these types of communications, please e-mail [email or web address]

## 2020-07-29 ENCOUNTER — DOCUMENT SCAN (OUTPATIENT)
Dept: HOME HEALTH SERVICES | Facility: HOSPITAL | Age: 65
End: 2020-07-29
Payer: MEDICARE

## 2020-07-29 RX ORDER — ALBUTEROL SULFATE 90 UG/1
2 AEROSOL, METERED RESPIRATORY (INHALATION) EVERY 4 HOURS PRN
Qty: 18 G | Refills: 11 | Status: SHIPPED | OUTPATIENT
Start: 2020-07-29 | End: 2020-08-04 | Stop reason: SDUPTHER

## 2020-07-29 NOTE — TELEPHONE ENCOUNTER
----- Message from Brittany King sent at 7/29/2020 11:09 AM CDT -----  Type:  RX Refill Request    Who Called: pt  Refill or New Rx:refill  RX Name and Strength:her inhaler  How is the patient currently taking it? (ex. 1XDay):4XDay, she ran out  Is this a 30 day or 90 day RX:?  Preferred Pharmacy with phone number:.  [facility] - [place] - [address] [address], [place]  [address] [address], [facility] [place] [address]  Phone: [phone] Fax: [phone]  Local or Mail Order:local  Ordering Provider:Dr Andrews  Would the patient rather a call back or a response via [facility]? Call back   Best Call Back Number:[phone]  Additional Information: .    Thank you

## 2020-07-30 ENCOUNTER — TELEPHONE (OUTPATIENT)
Dept: INTERNAL MEDICINE | Facility: CLINIC | Age: 65
End: 2020-07-30

## 2020-07-30 NOTE — TELEPHONE ENCOUNTER
Called Marely and she said that they spoke to pt earlier and let her know to go to ER and she said no she will not go to ER.    She said that Lana said that she thinks pt is non compliant with O2.  She never has it on, and she will have to  her to put on and get her O2 up from 70-80's to finally reach 90%.    She just wanted us to know what was going on.   They tried to reach her this afternoon to check on her and they can't reach her.

## 2020-07-30 NOTE — TELEPHONE ENCOUNTER
Spoke with Julio Cesar, she said, pt's oxygen sat has been low. It's staying in the 70's and 80's with pt on 4 liters. She advised pt to go to the ER. Pt wanted to wait for a family member. julio cesar is no longer at the pt's home. Informed her that I will call pt to find out if she went to the ER. Called pt, no answer. Left message for call back on voice mail.

## 2020-07-30 NOTE — TELEPHONE ENCOUNTER
----- Message from Nancy Chapin sent at 7/30/2020 12:44 PM CDT -----  [facility] Lana [facility]  Just left pt home, pt cannot keep her oxygen stats up. She is remaining in the 70s & 80's. Pt refuses to go to the ER. She strongly encouraged pt but pt wants to wait for a family member.   Lana at [phone].

## 2020-07-30 NOTE — TELEPHONE ENCOUNTER
----- Message from Betty Gibson sent at 7/30/2020  3:43 PM CDT -----  [facility], with [facility], would like return call from nurse regarding pt, ; states unable to contatct patient. Please call back at [phone].  Md Fracisco

## 2020-07-30 NOTE — TELEPHONE ENCOUNTER
----- Message from Nancy Chapin sent at 7/30/2020 12:44 PM CDT -----  [facility]  Just left pt home, pt cannot keep her oxygen stats up. She is remaining in the 70s & 80's. Pt refuses to go to the ER. She strongly encouraged pt but pt wants to wait for a family member.   Lana at [phone].

## 2020-08-03 DIAGNOSIS — Z76.89 ESTABLISHING CARE WITH NEW DOCTOR, ENCOUNTER FOR: Primary | ICD-10-CM

## 2020-08-04 ENCOUNTER — HOSPITAL ENCOUNTER (OUTPATIENT)
Dept: CARDIOLOGY | Facility: HOSPITAL | Age: 65
Discharge: HOME | End: 2020-08-04
Attending: INTERNAL MEDICINE
Payer: MEDICARE

## 2020-08-04 ENCOUNTER — TELEPHONE (OUTPATIENT)
Dept: INTERNAL MEDICINE | Facility: CLINIC | Age: 65
End: 2020-08-04

## 2020-08-04 ENCOUNTER — OFFICE VISIT (OUTPATIENT)
Dept: INTERNAL MEDICINE | Facility: CLINIC | Age: 65
End: 2020-08-04
Payer: MEDICARE

## 2020-08-04 ENCOUNTER — OFFICE VISIT (OUTPATIENT)
Dept: CARDIOLOGY | Facility: CLINIC | Age: 65
End: 2020-08-04
Payer: MEDICARE

## 2020-08-04 VITALS
HEIGHT: 67 IN | DIASTOLIC BLOOD PRESSURE: 64 MMHG | SYSTOLIC BLOOD PRESSURE: 138 MMHG | HEART RATE: 66 BPM | BODY MASS INDEX: 42.63 KG/M2 | WEIGHT: 271.63 LBS | OXYGEN SATURATION: 93 %

## 2020-08-04 VITALS
TEMPERATURE: 98 F | HEART RATE: 66 BPM | SYSTOLIC BLOOD PRESSURE: 138 MMHG | BODY MASS INDEX: 42.63 KG/M2 | DIASTOLIC BLOOD PRESSURE: 64 MMHG | WEIGHT: 271.63 LBS | OXYGEN SATURATION: 93 % | HEIGHT: 67 IN

## 2020-08-04 DIAGNOSIS — Z76.89 ESTABLISHING CARE WITH NEW DOCTOR, ENCOUNTER FOR: ICD-10-CM

## 2020-08-04 DIAGNOSIS — J18.9 PNEUMONIA DUE TO INFECTIOUS ORGANISM, UNSPECIFIED LATERALITY, UNSPECIFIED PART OF LUNG: ICD-10-CM

## 2020-08-04 DIAGNOSIS — E78.00 PURE HYPERCHOLESTEROLEMIA: ICD-10-CM

## 2020-08-04 DIAGNOSIS — M19.90 ARTHRITIS: ICD-10-CM

## 2020-08-04 DIAGNOSIS — Z95.5 STENTED CORONARY ARTERY: ICD-10-CM

## 2020-08-04 DIAGNOSIS — I50.42 CHRONIC COMBINED SYSTOLIC AND DIASTOLIC CONGESTIVE HEART FAILURE: ICD-10-CM

## 2020-08-04 DIAGNOSIS — I10 ESSENTIAL HYPERTENSION: ICD-10-CM

## 2020-08-04 DIAGNOSIS — G89.29 OTHER CHRONIC PAIN: ICD-10-CM

## 2020-08-04 DIAGNOSIS — Z12.11 ENCOUNTER FOR SCREENING COLONOSCOPY: ICD-10-CM

## 2020-08-04 DIAGNOSIS — I25.10 CORONARY ARTERY DISEASE INVOLVING NATIVE CORONARY ARTERY OF NATIVE HEART WITHOUT ANGINA PECTORIS: ICD-10-CM

## 2020-08-04 DIAGNOSIS — Z12.31 SCREENING MAMMOGRAM, ENCOUNTER FOR: ICD-10-CM

## 2020-08-04 DIAGNOSIS — I25.2 HISTORY OF MI (MYOCARDIAL INFARCTION): Primary | ICD-10-CM

## 2020-08-04 DIAGNOSIS — E11.8 TYPE 2 DIABETES MELLITUS WITH COMPLICATION, WITH LONG-TERM CURRENT USE OF INSULIN: Primary | ICD-10-CM

## 2020-08-04 DIAGNOSIS — R06.00 DYSPNEA, UNSPECIFIED TYPE: ICD-10-CM

## 2020-08-04 DIAGNOSIS — Z79.4 TYPE 2 DIABETES MELLITUS WITH COMPLICATION, WITH LONG-TERM CURRENT USE OF INSULIN: Primary | ICD-10-CM

## 2020-08-04 PROCEDURE — 99999 PR PBB SHADOW E&M-EST. PATIENT-LVL V: CPT | Mod: PBBFAC,,, | Performed by: FAMILY MEDICINE

## 2020-08-04 PROCEDURE — 99999 PR PBB SHADOW E&M-EST. PATIENT-LVL V: ICD-10-PCS | Mod: PBBFAC,,, | Performed by: INTERNAL MEDICINE

## 2020-08-04 PROCEDURE — 3008F PR BODY MASS INDEX (BMI) DOCUMENTED: ICD-10-PCS | Mod: CPTII,S$GLB,, | Performed by: INTERNAL MEDICINE

## 2020-08-04 PROCEDURE — 3078F PR MOST RECENT DIASTOLIC BLOOD PRESSURE < 80 MM HG: ICD-10-PCS | Mod: CPTII,S$GLB,, | Performed by: FAMILY MEDICINE

## 2020-08-04 PROCEDURE — 3046F HEMOGLOBIN A1C LEVEL >9.0%: CPT | Mod: CPTII,S$GLB,, | Performed by: FAMILY MEDICINE

## 2020-08-04 PROCEDURE — 3008F PR BODY MASS INDEX (BMI) DOCUMENTED: ICD-10-PCS | Mod: CPTII,S$GLB,, | Performed by: FAMILY MEDICINE

## 2020-08-04 PROCEDURE — 3075F SYST BP GE 130 - 139MM HG: CPT | Mod: CPTII,S$GLB,, | Performed by: INTERNAL MEDICINE

## 2020-08-04 PROCEDURE — 3078F DIAST BP <80 MM HG: CPT | Mod: CPTII,S$GLB,, | Performed by: FAMILY MEDICINE

## 2020-08-04 PROCEDURE — 93005 ELECTROCARDIOGRAM TRACING: CPT | Mod: PO

## 2020-08-04 PROCEDURE — 3078F PR MOST RECENT DIASTOLIC BLOOD PRESSURE < 80 MM HG: ICD-10-PCS | Mod: CPTII,S$GLB,, | Performed by: INTERNAL MEDICINE

## 2020-08-04 PROCEDURE — 3075F SYST BP GE 130 - 139MM HG: CPT | Mod: CPTII,S$GLB,, | Performed by: FAMILY MEDICINE

## 2020-08-04 PROCEDURE — 3046F PR MOST RECENT HEMOGLOBIN A1C LEVEL > 9.0%: ICD-10-PCS | Mod: CPTII,S$GLB,, | Performed by: FAMILY MEDICINE

## 2020-08-04 PROCEDURE — 3008F BODY MASS INDEX DOCD: CPT | Mod: CPTII,S$GLB,, | Performed by: FAMILY MEDICINE

## 2020-08-04 PROCEDURE — 93010 EKG 12-LEAD: ICD-10-PCS | Mod: ,,, | Performed by: INTERNAL MEDICINE

## 2020-08-04 PROCEDURE — 3075F PR MOST RECENT SYSTOLIC BLOOD PRESS GE 130-139MM HG: ICD-10-PCS | Mod: CPTII,S$GLB,, | Performed by: FAMILY MEDICINE

## 2020-08-04 PROCEDURE — 99499 UNLISTED E&M SERVICE: CPT | Mod: S$GLB,,, | Performed by: FAMILY MEDICINE

## 2020-08-04 PROCEDURE — 99499 RISK ADDL DX/OHS AUDIT: ICD-10-PCS | Mod: S$GLB,,, | Performed by: FAMILY MEDICINE

## 2020-08-04 PROCEDURE — 99214 PR OFFICE/OUTPT VISIT, EST, LEVL IV, 30-39 MIN: ICD-10-PCS | Mod: S$GLB,,, | Performed by: FAMILY MEDICINE

## 2020-08-04 PROCEDURE — 93010 ELECTROCARDIOGRAM REPORT: CPT | Mod: ,,, | Performed by: INTERNAL MEDICINE

## 2020-08-04 PROCEDURE — 3078F DIAST BP <80 MM HG: CPT | Mod: CPTII,S$GLB,, | Performed by: INTERNAL MEDICINE

## 2020-08-04 PROCEDURE — 99214 OFFICE O/P EST MOD 30 MIN: CPT | Mod: S$GLB,,, | Performed by: FAMILY MEDICINE

## 2020-08-04 PROCEDURE — 99205 PR OFFICE/OUTPT VISIT, NEW, LEVL V, 60-74 MIN: ICD-10-PCS | Mod: S$GLB,,, | Performed by: INTERNAL MEDICINE

## 2020-08-04 PROCEDURE — 3008F BODY MASS INDEX DOCD: CPT | Mod: CPTII,S$GLB,, | Performed by: INTERNAL MEDICINE

## 2020-08-04 PROCEDURE — 99999 PR PBB SHADOW E&M-EST. PATIENT-LVL V: CPT | Mod: PBBFAC,,, | Performed by: INTERNAL MEDICINE

## 2020-08-04 PROCEDURE — 99999 PR PBB SHADOW E&M-EST. PATIENT-LVL V: ICD-10-PCS | Mod: PBBFAC,,, | Performed by: FAMILY MEDICINE

## 2020-08-04 PROCEDURE — 99205 OFFICE O/P NEW HI 60 MIN: CPT | Mod: S$GLB,,, | Performed by: INTERNAL MEDICINE

## 2020-08-04 PROCEDURE — 3075F PR MOST RECENT SYSTOLIC BLOOD PRESS GE 130-139MM HG: ICD-10-PCS | Mod: CPTII,S$GLB,, | Performed by: INTERNAL MEDICINE

## 2020-08-04 RX ORDER — HYDROCODONE BITARTRATE AND ACETAMINOPHEN 5; 325 MG/1; MG/1
TABLET ORAL
COMMUNITY
Start: 2020-05-22 | End: 2020-08-04

## 2020-08-04 RX ORDER — HYDROCODONE BITARTRATE AND ACETAMINOPHEN 7.5; 325 MG/1; MG/1
1 TABLET ORAL EVERY 8 HOURS PRN
COMMUNITY
Start: 2020-07-21 | End: 2020-08-24 | Stop reason: SDUPTHER

## 2020-08-04 RX ORDER — MUPIROCIN 20 MG/G
OINTMENT TOPICAL DAILY PRN
COMMUNITY

## 2020-08-04 RX ORDER — ALBUTEROL SULFATE 90 UG/1
2 AEROSOL, METERED RESPIRATORY (INHALATION)
COMMUNITY
Start: 2020-07-21 | End: 2020-12-22 | Stop reason: SDUPTHER

## 2020-08-04 RX ORDER — OMEPRAZOLE 40 MG/1
40 CAPSULE, DELAYED RELEASE ORAL EVERY MORNING
Qty: 30 CAPSULE | Refills: 1 | Status: SHIPPED | OUTPATIENT
Start: 2020-08-04 | End: 2020-10-12 | Stop reason: SDUPTHER

## 2020-08-04 RX ORDER — CYCLOBENZAPRINE HCL 10 MG
10 TABLET ORAL DAILY PRN
Qty: 30 TABLET | Refills: 1 | Status: SHIPPED | OUTPATIENT
Start: 2020-08-04 | End: 2020-09-01 | Stop reason: SDUPTHER

## 2020-08-04 RX ORDER — ACETAMINOPHEN 500 MG
1 TABLET ORAL
COMMUNITY

## 2020-08-04 RX ORDER — SEMAGLUTIDE 1.34 MG/ML
0.25 INJECTION, SOLUTION SUBCUTANEOUS
Qty: 1.5 ML | Refills: 0 | Status: SHIPPED | OUTPATIENT
Start: 2020-08-04 | End: 2020-10-12

## 2020-08-04 RX ORDER — OMEPRAZOLE 40 MG/1
40 CAPSULE, DELAYED RELEASE ORAL
COMMUNITY
End: 2020-08-04 | Stop reason: SDUPTHER

## 2020-08-04 RX ORDER — INSULIN GLARGINE 100 [IU]/ML
50 INJECTION, SOLUTION SUBCUTANEOUS
COMMUNITY

## 2020-08-04 RX ORDER — ISOSORBIDE DINITRATE AND HYDRALAZINE HYDROCHLORIDE 37.5; 2 MG/1; MG/1
1 TABLET ORAL 3 TIMES DAILY
Qty: 90 TABLET | Refills: 1 | Status: SHIPPED | OUTPATIENT
Start: 2020-08-04 | End: 2020-09-18 | Stop reason: SDUPTHER

## 2020-08-04 NOTE — TELEPHONE ENCOUNTER
----- Message from Skyla Melendez sent at 8/4/2020 12:58 PM CDT -----  Contact: randa with  [phone]  Randa is calling to speak with the nurse. Randa states this member is in need of oxygen. Randa states they needs orders, clinical notes and medical necessity form faxed to .

## 2020-08-04 NOTE — PROGRESS NOTES
Subjective:       Patient ID: Marley Malagon is a 64 y.o. female.    Chief Complaint: Hospital Follow Up (BRG, SOB, fluid around lungs )    Hospitalized for PNA recently at [facility].  Taking unknown abx she states  Never received d/c summary      DM:  O: chronic  L:  D: worsening  C:  Delbert: insulin  Exac: diet      Pneumonia  She complains of cough and shortness of breath. There is no wheezing. This is a chronic problem. The current episode started more than 1 year ago. The problem occurs constantly. The problem has been gradually worsening. The cough is non-productive. Associated symptoms include orthopnea. Pertinent negatives include no chest pain. Her symptoms are aggravated by minimal activity. Her symptoms are alleviated by nothing.     Review of Systems   Respiratory: Positive for cough and shortness of breath. Negative for wheezing.    Cardiovascular: Negative for chest pain, palpitations and leg swelling.   Gastrointestinal: Negative for abdominal pain.       Objective:      Physical Exam  Vitals signs and nursing note reviewed.   Constitutional:       General: She is in acute distress.      Appearance: Normal appearance. She is well-developed. She is not diaphoretic.   HENT:      Head: Normocephalic and atraumatic.      Nose: Nose normal.   Pulmonary:      Effort: Pulmonary effort is normal. No respiratory distress.      Breath sounds: Normal breath sounds. No wheezing.      Comments: NC in place.  Skin:     General: Skin is warm and dry.      Findings: No erythema or rash.   Neurological:      Mental Status: She is alert.         Assessment:       1. Type 2 diabetes mellitus with complication, with long-term current use of insulin    2. Screening mammogram, encounter for    3. Encounter for screening colonoscopy    4. Other chronic pain    5. Pneumonia due to infectious organism, unspecified laterality, unspecified part of lung    6. Essential hypertension    7. Arthritis        Plan:     Problem  List Items Addressed This Visit        Neuro    Chronic pain       Pulmonary    Pneumonia due to infectious organism       Cardiac/Vascular    Essential hypertension    Relevant Medications    isosorbide-hydrALAZINE 20-37.5 mg (BIDIL) 20-37.5 mg Tab       Renal/    Screening mammogram, encounter for    Relevant Orders    Mammo Digital Screening Bilat       Endocrine    Type 2 diabetes mellitus with complication, with long-term current use of insulin - Primary    Current Assessment & Plan      Discussed with pt if they have a lump or swelling in your neck, hoarseness, trouble swallowing, or shortness of breath, that These may be symptoms of thyroid cancer. In studies with rodents, Ozempic® and medicines that work like Ozempic® caused thyroid tumors, including thyroid cancer. It is not known if Ozempic® will cause thyroid tumors or a type of thyroid cancer called medullary thyroid carcinoma (MTC) in people.  Discussed with pt that if they have a known history of MEN2, pancreatitis, history, or retinopathy that they would not be a good candidate. Pt has verbalized that they do not have any previous history as described above and are willing to start this medication.  Pt also understands that this medication may cause Gallbladder disease or hypersensitivity to Ozempic.           Relevant Medications    insulin glargine 100 units/mL (3mL) SubQ pen    semaglutide (OZEMPIC) 0.25 mg or 0.5 mg(2 mg/1.5 mL) PnIj    Other Relevant Orders    Ambulatory referral/consult to Optometry       GI    Encounter for screening colonoscopy    Relevant Orders    Case request GI: COLONOSCOPY (Completed)       Orthopedic    Arthritis    Relevant Medications    cyclobenzaprine (FLEXERIL) 10 MG tablet

## 2020-08-04 NOTE — PROGRESS NOTES
Subjective:   Patient ID:  Marley Malagon is a 64 y.o. female who presents for follow-up of Congestive Heart Failure  Pt referred for CHF and CAD.Pt also has hx of COPD  Pt worsening SOB but noted admission for pneumonia.  Echo=45-50%, pulmonary HTN. Pt on Home O2    Congestive Heart Failure  Presents for follow-up visit. Pertinent negatives include no abdominal pain, chest pain, chest pressure, claudication, edema, fatigue, muscle weakness, near-syncope, nocturia, orthopnea, palpitations, paroxysmal nocturnal dyspnea, shortness of breath or unexpected weight change. The symptoms have been stable. Her past medical history is significant for CAD. Compliance with total regimen is %. Compliance with diet is %. Compliance with exercise is %. Compliance with medications is %.   Coronary Artery Disease  Presents for follow-up visit. Pertinent negatives include no chest pain, chest pressure, chest tightness, dizziness, leg swelling, muscle weakness, palpitations, shortness of breath or weight gain. Risk factors include hyperlipidemia. Her past medical history is significant for CHF. The symptoms have been stable. Compliance with diet is variable. Compliance with exercise is variable. Compliance with medications is good.   Hyperlipidemia  This is a chronic problem. The current episode started more than 1 year ago. The problem is controlled. Recent lipid tests were reviewed and are variable. Pertinent negatives include no chest pain or shortness of breath. Current antihyperlipidemic treatment includes statins. The current treatment provides moderate improvement of lipids. There are no compliance problems.        Review of Systems   Constitution: Negative. Negative for fatigue, unexpected weight change and weight gain.   HENT: Negative.    Eyes: Negative.    Cardiovascular: Negative.  Negative for chest pain, claudication, leg swelling, near-syncope and palpitations.   Respiratory: Negative for chest  tightness and shortness of breath.    Endocrine: Negative.    Hematologic/Lymphatic: Negative.    Skin: Negative.    Musculoskeletal: Negative for muscle weakness.   Gastrointestinal: Negative.  Negative for abdominal pain.   Genitourinary: Negative.  Negative for nocturia.   Neurological: Negative.  Negative for dizziness.   Psychiatric/Behavioral: Negative.    Allergic/Immunologic: Negative.      Family History   Problem Relation Age of Onset    Diabetes Mother     Kidney disease Mother     Alcohol abuse Father     Breast cancer Maternal Aunt      Past Medical History:   Diagnosis Date    Arthritis     COPD (chronic obstructive pulmonary disease)     Diabetes mellitus, type 2     Heart attack 2005    Hypertension     ELAINA (obstructive sleep apnea)      Social History     Socioeconomic History    Marital status:      Spouse name: Not on file    Number of children: Not on file    Years of education: 11    Highest education level: Not on file   Occupational History    Not on file   Social Needs    Financial resource strain: Not on file    Food insecurity     Worry: Not on file     Inability: Not on file    Transportation needs     Medical: Not on file     Non-medical: Not on file   Tobacco Use    Smoking status: Never Smoker    Smokeless tobacco: Never Used   Substance and Sexual Activity    Alcohol use: No     Alcohol/week: 0.0 standard drinks    Drug use: No    Sexual activity: Never     Birth control/protection: Surgical   Lifestyle    Physical activity     Days per week: Not on file     Minutes per session: Not on file    Stress: Not on file   Relationships    Social connections     Talks on phone: Not on file     Gets together: Not on file     Attends Faith service: Not on file     Active member of club or organization: Not on file     Attends meetings of clubs or organizations: Not on file     Relationship status: Not on file   Other Topics Concern    Are you pregnant or  "think you may be? Not Asked    Breast-feeding Not Asked   Social History Narrative    Not on file     Current Outpatient Medications on File Prior to Visit   Medication Sig Dispense Refill    aspirin (ECOTRIN) 81 MG EC tablet Take 81 mg by mouth once daily.      atorvastatin (LIPITOR) 40 MG tablet Take 1 tablet (40 mg total) by mouth once daily. 90 tablet 3    fluticasone (FLONASE) 50 mcg/actuation nasal spray 1 spray by Each Nare route once daily.      fluticasone furoate-vilanteroL (BREO) 100-25 mcg/dose diskus inhaler Inhale 1 puff into the lungs once daily. 30 each 11    furosemide (LASIX) 40 MG tablet Take 40 mg by mouth.      insulin glargine 100 units/mL (3mL) SubQ pen Inject 50 Units into the skin.      insulin lispro 100 unit/mL injection Inject 10 Units into the skin 3 (three) times daily.      insulin syringe-needle U-100 1 mL 31 gauge x 5/16 Syrg USE WITH insulin TWICE DAILY UNDER SKIN 300 each 3    LYRICA 75 mg capsule TAKE ONE CAPSULE BY MOUTH TWICE DAILY 60 capsule 0    metoprolol succinate (TOPROL-XL) 100 MG 24 hr tablet Take 100 mg by mouth.      nitroGLYCERIN (NITROSTAT) 0.4 MG SL tablet Place 1 tablet (0.4 mg total) under the tongue every 5 (five) minutes as needed for Chest pain.      nystatin (MYCOSTATIN) cream Apply topically 2 (two) times daily. 30 g 0    pen needle, diabetic 31 gauge x 5/16" Ndle One injection daily 100 each 5    potassium chloride SA (K-DUR,KLOR-CON) 20 MEQ tablet Take 20 mEq by mouth once daily.      promethazine (PHENERGAN) 25 MG tablet Take 25 mg by mouth every 6 (six) hours as needed.      torsemide (DEMADEX) 20 MG Tab Take 1 tablet (20 mg total) by mouth once daily. 30 tablet 0    triamcinolone acetonide 0.1% (KENALOG) 0.1 % cream Apply topically 2 (two) times daily. 15 g 0    TRUE METRIX GLUCOSE TEST STRIP Strp USE TO TEST TWICE DAILY 100 strip 0    TRUEPLUS LANCETS 28 gauge Misc USE TO TEST TWICE DAILY 100 each 0    vitamin D (VITAMIN D3) 1000 " units Tab Take 2,000 Units by mouth once daily.      multivitamin (ONE DAILY MULTIVITAMIN) per tablet Take 1 tablet by mouth once daily.      [DISCONTINUED] albuterol (PROVENTIL/VENTOLIN HFA) 90 mcg/actuation inhaler Inhale 2 puffs into the lungs every 4 (four) hours as needed. (Patient not taking: Reported on 8/4/2020) 18 g 11    [DISCONTINUED] cyclobenzaprine (FLEXERIL) 10 MG tablet Take 10 mg by mouth daily as needed.      [DISCONTINUED] HYDROcodone-acetaminophen (NORCO)  mg per tablet Take 1 tablet by mouth 2 (two) times daily as needed. (Patient not taking: Reported on 8/4/2020) 60 tablet 0    [DISCONTINUED] HYDROcodone-acetaminophen (NORCO)  mg per tablet Take 1 tablet by mouth every 12 (twelve) hours as needed. HYDROCODONE-ACETAMINOPHEN (Norco)  MG ORAL TAB - 1 tab po q 6 hrs prn pain (Patient not taking: Reported on 8/4/2020) 60 tablet 0    [DISCONTINUED] isosorbide-hydrALAZINE 20-37.5 mg (BIDIL) 20-37.5 mg Tab Take 1 tablet by mouth.      [DISCONTINUED] mupirocin (BACTROBAN) 2 % ointment Apply topically 3 (three) times daily. (Patient not taking: Reported on 8/4/2020) 30 g 0    [DISCONTINUED] omeprazole (PRILOSEC) 40 MG capsule TAKE ONE CAPSULE BY MOUTH EVERY DAY 90 capsule 1     Current Facility-Administered Medications on File Prior to Visit   Medication Dose Route Frequency Provider Last Rate Last Dose    [DISCONTINUED] GENERIC EXTERNAL MEDICATION     Generic External Data Provider        [DISCONTINUED] GENERIC EXTERNAL MEDICATION     Generic External Data Provider         Review of patient's allergies indicates:   Allergen Reactions    Ace inhibitors Swelling       Objective:     Physical Exam   Constitutional: She is oriented to person, place, and time. She appears well-developed and well-nourished.   HENT:   Head: Normocephalic and atraumatic.   Eyes: Pupils are equal, round, and reactive to light. Conjunctivae and EOM are normal.   Neck: Normal range of motion. Neck  supple.   Cardiovascular: Normal rate, regular rhythm, normal heart sounds and intact distal pulses.   Pulmonary/Chest: Effort normal and breath sounds normal.   Abdominal: Soft. Bowel sounds are normal.   Musculoskeletal: Normal range of motion.   Neurological: She is alert and oriented to person, place, and time.   Skin: Skin is warm and dry.   Psychiatric: She has a normal mood and affect.   Nursing note and vitals reviewed.      Assessment:     1. History of MI (myocardial infarction)    2. Chronic combined systolic and diastolic congestive heart failure    3. Coronary artery disease involving native coronary artery of native heart without angina pectoris    4. Stented coronary artery    5. Pure hypercholesterolemia    6. Essential hypertension    7. Dyspnea, unspecified type        Plan:     History of MI (myocardial infarction)    Chronic combined systolic and diastolic congestive heart failure    Coronary artery disease involving native coronary artery of native heart without angina pectoris    Stented coronary artery    Pure hypercholesterolemia    Essential hypertension    Dyspnea, unspecified type      Continue asa- cad  Continue metoprolol, diuretics-chf/htn  Continue statin-hlp

## 2020-08-07 ENCOUNTER — TELEPHONE (OUTPATIENT)
Dept: INTERNAL MEDICINE | Facility: CLINIC | Age: 65
End: 2020-08-07

## 2020-08-07 NOTE — TELEPHONE ENCOUNTER
Pt is requesting a medication for UTI. Pt stated that she discussed this with PCP on Monday but nothing was sent to the pharmacy. Pt also stated that she is on lantus and humalog now but wants to know is she supposed to stop one of them. Please advise.

## 2020-08-07 NOTE — TELEPHONE ENCOUNTER
----- Message from Renetta Forte sent at 8/7/2020 12:37 PM CDT -----  Type:  Needs Medical Advice    Who Called: SEDA WOOTEN  Symptoms (please be specific):  How long has patient had these symptoms:   Pharmacy name and phone #:   Would the patient rather a call back or a response via [facility]? Call   Best Call Back Number: [phone] (home)    Additional Information:  Pt is requesting a call back from the nurse in regards to the pt med OZEMPIC and lantus humalog please

## 2020-08-07 NOTE — TELEPHONE ENCOUNTER
----- Message from Roberta Nunez sent at 8/7/2020  9:50 AM CDT -----  Type:  Needs Medical Advice    Who Called: PATIENT  Symptoms (please be specific): YELLOW URINE W/ODOR SORES ON BODY   How long has patient had these symptoms:  2 MONTHS  Pharmacy name and phone #:    [facility] - [place] - [address], [place]  [address], [place] [address]  Phone: [phone] Fax: [phone]    Would the patient rather a call back or a response via [facility]? CALL  Best Call Back Number: [phone]  Additional Information: PLEASE CALL BACK

## 2020-08-07 NOTE — TELEPHONE ENCOUNTER
----- Message from Mira Jasmine sent at 8/7/2020  8:55 AM CDT -----  Pt is requesting a call back regarding 2 sores and also and order and the color of urine is possible an UTI . Please call back at [phone]

## 2020-08-10 ENCOUNTER — DOCUMENT SCAN (OUTPATIENT)
Dept: HOME HEALTH SERVICES | Facility: HOSPITAL | Age: 65
End: 2020-08-10
Payer: MEDICARE

## 2020-08-10 ENCOUNTER — TELEPHONE (OUTPATIENT)
Dept: CARDIOLOGY | Facility: CLINIC | Age: 65
End: 2020-08-10

## 2020-08-10 NOTE — TELEPHONE ENCOUNTER
I called again and she thinks she had uti and problem with her stomach. Was trying to make an appt with her pcp and she hung up on me.  Please call her and see if you can assist. Thank you Nathalie       ----- Message from Ashley Espino sent at 8/10/2020 11:26 AM CDT -----  Contact: Self  Pt would a call back at [phone] in regards to getting X Rays scheduled at [facility] location and getting a prescription order for infection medication.     Thanks,  Zs

## 2020-08-10 NOTE — TELEPHONE ENCOUNTER
Rtc and person on the other end did not hold a conversation. Cm  ----- Message from Ashley Espino sent at 8/10/2020 11:26 AM CDT -----  Contact: Self  Pt would a call back at [phone] in regards to getting X Rays scheduled at [facility] location and getting a prescription order for infection medication.     Thanks,  Zs

## 2020-08-11 ENCOUNTER — LAB VISIT (OUTPATIENT)
Dept: LAB | Facility: HOSPITAL | Age: 65
End: 2020-08-11
Attending: FAMILY MEDICINE
Payer: MEDICARE

## 2020-08-11 ENCOUNTER — TELEPHONE (OUTPATIENT)
Dept: INTERNAL MEDICINE | Facility: CLINIC | Age: 65
End: 2020-08-11

## 2020-08-11 DIAGNOSIS — R30.0 DYSURIA: Primary | ICD-10-CM

## 2020-08-11 DIAGNOSIS — R35.0 URINARY FREQUENCY: Primary | ICD-10-CM

## 2020-08-11 PROCEDURE — 81001 URINALYSIS AUTO W/SCOPE: CPT

## 2020-08-11 NOTE — TELEPHONE ENCOUNTER
Haven with [facility]  [phone] called and said that she is with pt and pt said she called yesterday and was told to bring in urine for testing.  I let her know we didn't receive a call from pt.      They will collect urine and they want to bring to us for testing.  Pt is having frequency and burning.     Can you place home collect order?

## 2020-08-12 ENCOUNTER — TELEPHONE (OUTPATIENT)
Dept: INTERNAL MEDICINE | Facility: CLINIC | Age: 65
End: 2020-08-12

## 2020-08-12 ENCOUNTER — PATIENT MESSAGE (OUTPATIENT)
Dept: INTERNAL MEDICINE | Facility: CLINIC | Age: 65
End: 2020-08-12

## 2020-08-12 ENCOUNTER — PATIENT OUTREACH (OUTPATIENT)
Dept: ADMINISTRATIVE | Facility: HOSPITAL | Age: 65
End: 2020-08-12

## 2020-08-12 LAB
BACTERIA #/AREA URNS AUTO: ABNORMAL /HPF
BILIRUB UR QL STRIP: NEGATIVE
CAOX CRY UR QL COMP ASSIST: ABNORMAL
CLARITY UR REFRACT.AUTO: ABNORMAL
COLOR UR AUTO: ABNORMAL
GLUCOSE UR QL STRIP: NEGATIVE
HGB UR QL STRIP: NEGATIVE
HYALINE CASTS UR QL AUTO: 14 /LPF
KETONES UR QL STRIP: NEGATIVE
LEUKOCYTE ESTERASE UR QL STRIP: NEGATIVE
MICROSCOPIC COMMENT: ABNORMAL
NITRITE UR QL STRIP: NEGATIVE
PH UR STRIP: 5 [PH] (ref 5–8)
PROT UR QL STRIP: NEGATIVE
RBC #/AREA URNS AUTO: 1 /HPF (ref 0–4)
SP GR UR STRIP: 1.01 (ref 1–1.03)
SQUAMOUS #/AREA URNS AUTO: 17 /HPF
URN SPEC COLLECT METH UR: ABNORMAL
WBC #/AREA URNS AUTO: 8 /HPF (ref 0–5)

## 2020-08-12 NOTE — TELEPHONE ENCOUNTER
----- Message from Rosa Conrad sent at 8/12/2020  8:17 AM CDT -----  Contact: Pt  Type:  Test Results    Who Called:  pt   Name of Test (Lab/Mammo/Etc): urine sample for a UTI  Date of Test: 08/11/20  Ordering Provider: the nurse stated she would get an order after being done  Where the test was performed: the home health nurse did it at the pt's home  Would the patient rather a call back or a response via [facility]? Phone   Best Call Back Number:  [phone]  Additional Information:

## 2020-08-13 ENCOUNTER — LAB VISIT (OUTPATIENT)
Dept: LAB | Facility: HOSPITAL | Age: 65
End: 2020-08-13
Attending: FAMILY MEDICINE
Payer: MEDICARE

## 2020-08-13 ENCOUNTER — DOCUMENT SCAN (OUTPATIENT)
Dept: HOME HEALTH SERVICES | Facility: HOSPITAL | Age: 65
End: 2020-08-13
Payer: MEDICARE

## 2020-08-13 ENCOUNTER — TELEPHONE (OUTPATIENT)
Dept: INTERNAL MEDICINE | Facility: CLINIC | Age: 65
End: 2020-08-13

## 2020-08-13 DIAGNOSIS — R35.0 URINARY FREQUENCY: ICD-10-CM

## 2020-08-13 PROCEDURE — 87086 URINE CULTURE/COLONY COUNT: CPT

## 2020-08-13 NOTE — TELEPHONE ENCOUNTER
----- Message from Ivy Khan sent at 8/13/2020 11:42 AM CDT -----  Contact: pt  The pt request a call concerning her test results, no additional info given and can be reached at  [phone]///thxMW

## 2020-08-13 NOTE — TELEPHONE ENCOUNTER
----- Message from Ivy Khan sent at 8/13/2020 11:42 AM CDT -----  Contact: pt  The pt request a call concerning her test results, no additional info given and can be reached at  [phone]///thxMW

## 2020-08-13 NOTE — TELEPHONE ENCOUNTER
Called NO labs they don't have enough urine to do culture they said to ask if there is still urine here in [place].  Will get with Concha.  The order was not linked to the appt for the urinalysis.     Concha said still have urine and will do Culture.   Booked and checked in.

## 2020-08-13 NOTE — TELEPHONE ENCOUNTER
There was an order for a urine Culture.  Why isn't it being processed?  What urinary symptoms is pt having?

## 2020-08-14 ENCOUNTER — TELEPHONE (OUTPATIENT)
Dept: ENDOSCOPY | Facility: HOSPITAL | Age: 65
End: 2020-08-14

## 2020-08-14 NOTE — TELEPHONE ENCOUNTER
Attempted to schedule colonoscopy with patient, she declined.  Stated she would call office when ready to schedule, number provided.

## 2020-08-15 LAB — BACTERIA UR CULT: NO GROWTH

## 2020-08-16 ENCOUNTER — PATIENT OUTREACH (OUTPATIENT)
Dept: ADMINISTRATIVE | Facility: OTHER | Age: 65
End: 2020-08-16

## 2020-08-17 ENCOUNTER — TELEPHONE (OUTPATIENT)
Dept: PAIN MEDICINE | Facility: CLINIC | Age: 65
End: 2020-08-17

## 2020-08-17 NOTE — TELEPHONE ENCOUNTER
----- Message from Mira Jasmine sent at 8/17/2020  8:21 AM CDT -----  Pt would like to know when office need to see her again. Not sure what date to schedule for the future. Would like to speak with nurse. Please call back at [phone]

## 2020-08-19 ENCOUNTER — DOCUMENT SCAN (OUTPATIENT)
Dept: HOME HEALTH SERVICES | Facility: HOSPITAL | Age: 65
End: 2020-08-19
Payer: MEDICARE

## 2020-08-20 ENCOUNTER — TELEPHONE (OUTPATIENT)
Dept: PHYSICAL MEDICINE AND REHAB | Facility: CLINIC | Age: 65
End: 2020-08-20

## 2020-08-20 NOTE — TELEPHONE ENCOUNTER
Spoke with pt; offered her to move her appt with  from 08/24 to 8/21. Pt stated she has to check with transportation first and she will call back tomorrow. Call ended well.

## 2020-08-20 NOTE — TELEPHONE ENCOUNTER
----- Message from Boby Blanchard sent at 8/20/2020  4:26 PM CDT -----  Contact: self  Would like to consult with nurse regarding pain medication.  Please contact Marley Malagon @ [phone].  Thanks/As

## 2020-08-21 ENCOUNTER — TELEPHONE (OUTPATIENT)
Dept: PAIN MEDICINE | Facility: CLINIC | Age: 65
End: 2020-08-21

## 2020-08-21 ENCOUNTER — TELEPHONE (OUTPATIENT)
Dept: CARDIOLOGY | Facility: CLINIC | Age: 65
End: 2020-08-21

## 2020-08-21 NOTE — TELEPHONE ENCOUNTER
We have not called pt , do not know who did. Called and tried to reach pt but her phone basket is full. Cm  ----- Message from Nya Francois MA sent at 8/21/2020  3:41 PM CDT -----  Contact: self  Patient is returning your call.

## 2020-08-21 NOTE — TELEPHONE ENCOUNTER
Advised pt that she has an appointment on 08/24/2020 @ 840am at this time the provider will provider any medication refills all questions answered//dp

## 2020-08-21 NOTE — TELEPHONE ENCOUNTER
----- Message from Arabella Rodriguez sent at 8/21/2020  8:00 AM CDT -----  Contact: Marley Roach is calling to check on the status of her pain medication request, please call her at [phone].      [facility] - [place] - [address], [place]  [address], [place] [address]  Phone: [phone] Fax: [phone]        Thanks  Td

## 2020-08-23 ENCOUNTER — TELEPHONE (OUTPATIENT)
Dept: PAIN MEDICINE | Facility: CLINIC | Age: 65
End: 2020-08-23

## 2020-08-23 NOTE — TELEPHONE ENCOUNTER
Tried to call to see if pt wants to do virtual or r/s due to weather. No answer. Left   Alexis Boyd, MA [facility]   [facility]

## 2020-08-24 ENCOUNTER — HOSPITAL ENCOUNTER (OUTPATIENT)
Dept: RADIOLOGY | Facility: HOSPITAL | Age: 65
Discharge: HOME | End: 2020-08-24
Attending: INTERNAL MEDICINE
Payer: MEDICARE

## 2020-08-24 ENCOUNTER — OFFICE VISIT (OUTPATIENT)
Dept: PAIN MEDICINE | Facility: CLINIC | Age: 65
End: 2020-08-24
Payer: MEDICARE

## 2020-08-24 ENCOUNTER — TELEPHONE (OUTPATIENT)
Dept: PAIN MEDICINE | Facility: CLINIC | Age: 65
End: 2020-08-24

## 2020-08-24 VITALS
HEART RATE: 95 BPM | HEIGHT: 67 IN | WEIGHT: 271.38 LBS | SYSTOLIC BLOOD PRESSURE: 150 MMHG | BODY MASS INDEX: 42.6 KG/M2 | DIASTOLIC BLOOD PRESSURE: 70 MMHG

## 2020-08-24 DIAGNOSIS — M54.2 CERVICAL MUSCLE PAIN: ICD-10-CM

## 2020-08-24 DIAGNOSIS — M47.812 CERVICAL SPONDYLOSIS: Primary | ICD-10-CM

## 2020-08-24 DIAGNOSIS — I50.42 CHRONIC COMBINED SYSTOLIC AND DIASTOLIC CONGESTIVE HEART FAILURE: ICD-10-CM

## 2020-08-24 DIAGNOSIS — M54.2 NECK PAIN: ICD-10-CM

## 2020-08-24 PROCEDURE — 3077F SYST BP >= 140 MM HG: CPT | Mod: CPTII,S$GLB,, | Performed by: PHYSICIAN ASSISTANT

## 2020-08-24 PROCEDURE — 71046 XR CHEST PA AND LATERAL: ICD-10-PCS | Mod: 26,,, | Performed by: RADIOLOGY

## 2020-08-24 PROCEDURE — 3078F PR MOST RECENT DIASTOLIC BLOOD PRESSURE < 80 MM HG: ICD-10-PCS | Mod: CPTII,S$GLB,, | Performed by: PHYSICIAN ASSISTANT

## 2020-08-24 PROCEDURE — 1101F PR PT FALLS ASSESS DOC 0-1 FALLS W/OUT INJ PAST YR: ICD-10-PCS | Mod: CPTII,S$GLB,, | Performed by: PHYSICIAN ASSISTANT

## 2020-08-24 PROCEDURE — 1101F PT FALLS ASSESS-DOCD LE1/YR: CPT | Mod: CPTII,S$GLB,, | Performed by: PHYSICIAN ASSISTANT

## 2020-08-24 PROCEDURE — 71046 X-RAY EXAM CHEST 2 VIEWS: CPT | Mod: TC

## 2020-08-24 PROCEDURE — 71046 X-RAY EXAM CHEST 2 VIEWS: CPT | Mod: 26,,, | Performed by: RADIOLOGY

## 2020-08-24 PROCEDURE — 3008F PR BODY MASS INDEX (BMI) DOCUMENTED: ICD-10-PCS | Mod: CPTII,S$GLB,, | Performed by: PHYSICIAN ASSISTANT

## 2020-08-24 PROCEDURE — 99214 OFFICE O/P EST MOD 30 MIN: CPT | Mod: S$GLB,,, | Performed by: PHYSICIAN ASSISTANT

## 2020-08-24 PROCEDURE — 3077F PR MOST RECENT SYSTOLIC BLOOD PRESSURE >= 140 MM HG: ICD-10-PCS | Mod: CPTII,S$GLB,, | Performed by: PHYSICIAN ASSISTANT

## 2020-08-24 PROCEDURE — 99999 PR PBB SHADOW E&M-EST. PATIENT-LVL V: ICD-10-PCS | Mod: PBBFAC,,, | Performed by: PHYSICIAN ASSISTANT

## 2020-08-24 PROCEDURE — 99214 PR OFFICE/OUTPT VISIT, EST, LEVL IV, 30-39 MIN: ICD-10-PCS | Mod: S$GLB,,, | Performed by: PHYSICIAN ASSISTANT

## 2020-08-24 PROCEDURE — 3008F BODY MASS INDEX DOCD: CPT | Mod: CPTII,S$GLB,, | Performed by: PHYSICIAN ASSISTANT

## 2020-08-24 PROCEDURE — 99999 PR PBB SHADOW E&M-EST. PATIENT-LVL V: CPT | Mod: PBBFAC,,, | Performed by: PHYSICIAN ASSISTANT

## 2020-08-24 PROCEDURE — 3078F DIAST BP <80 MM HG: CPT | Mod: CPTII,S$GLB,, | Performed by: PHYSICIAN ASSISTANT

## 2020-08-24 RX ORDER — HYDROCODONE BITARTRATE AND ACETAMINOPHEN 10; 325 MG/1; MG/1
1 TABLET ORAL EVERY 12 HOURS PRN
Qty: 60 TABLET | Refills: 0 | Status: SHIPPED | OUTPATIENT
Start: 2020-08-24 | End: 2020-09-23

## 2020-08-24 RX ORDER — HYDROCODONE BITARTRATE AND ACETAMINOPHEN 10; 325 MG/1; MG/1
1 TABLET ORAL EVERY 12 HOURS PRN
Qty: 60 TABLET | Refills: 0 | Status: SHIPPED | OUTPATIENT
Start: 2020-09-23 | End: 2020-10-23

## 2020-08-24 RX ORDER — CARVEDILOL 12.5 MG/1
TABLET ORAL
COMMUNITY
Start: 2020-05-18 | End: 2020-10-12

## 2020-08-24 NOTE — LETTER
August 24, 2020      [place]ulwinder [place]  [address] [address] [address]           [facility]  [address] [facility] [address]  Phone: [phone]  Fax: [phone]          Patient: Marley Malagon   MR Number: [id number]   YOB: 1955   Date of Visit: 8/24/2020       Dear Dr. Kulwinder Andrews:    Thank you for referring Marley Malagon to me for evaluation. Attached you will find relevant portions of my assessment and plan of care.    If you have questions, please do not hesitate to call me. I look forward to following Marley Malagon along with you.    Sincerely,    Maryjane Barnes PA-C    Enclosure  CC:  No Recipients    If you would like to receive this communication electronically, please contact [email or web address] or [phone] to request more information on [facility] Link access.    For providers and/or their staff who would like to refer a patient to [facility], please contact us through our one-stop-shop provider referral line, [facility], at [phone].    If you feel you have received this communication in error or would no longer like to receive these types of communications, please e-mail [email or web address]

## 2020-08-24 NOTE — TELEPHONE ENCOUNTER
Spoke with Pharmacy regarding [place]  prescription paper copy. Discontinue Norco 7.5 - 325 prescription and Norco  e-prescription.

## 2020-08-24 NOTE — PROGRESS NOTES
Chief Pain Complaint:  Neck Pain (Follow up)    Interval History (8/24/2020): Patient was last seen on 7/27/2020. At that visit, the plan was to start Norco 10/325 BID PRN, which she had previously been taking from Dr. Dixon. She wants to continue coming to our clinic since it is closer to her home.   Patient reports pain as 9/10 today.    Initial HPI (7/27/2020):  This patient is a 65 y.o. female who presents today complaining of the above noted pain/s. The patient describes the pain as follows.  Ms. Malagon is new patient clinic with complaints of neck and low back pain.  She reports having had these symptoms for approximately 2 years with no known inciting event.  Today she rates her neck pain as a 10/10 in her low back pain is rated as a 9/10.  She describes aching, throbbing, stabbing sensation which is worse with movement and standing improved with medications and rest.  She has tried numerous medications including ibuprofen, Lyrica, Lortab, Flexeril, Mobic.  She has also used a heating pad which has not been helpful.  She reports having arthritis from the neck all the way down to her low back and has previously seen an outside pain doctor however she reports that she was having to get someone to drive for in is 2 4 to drive and too expensive for her to continue going to that clinic.  They had maintained her with muscle relaxers and Lortab which she takes Lortab approximately 1-2 tablets per day of the 10/325 mg prescriptions.  She has had a lumbar epidural steroid injections several years ago which she did find to be helpful.  She has a referral for physical therapy which she will be starting in the next several days.  She endorses having numbness in her hands bilaterally almost of her pain is located in the cervical spine.  She is accompanied with a friend today to has endorse having had lumbar radiofrequency ablation in the past and how helpful this has been for her.    Previous Therapy:  Medications:   Motrin, Lyrica, hydrocodone, Flexeril  Injections: Lumbar ANDREA in the past  Surgeries: No spinal surgery   Physical Therapy: None         Past Surgical History:   Procedure Laterality Date    cardiac stent      CHOLECYSTECTOMY      HYSTERECTOMY      fibroids    OOPHORECTOMY       Family History   Problem Relation Age of Onset    Diabetes Mother     Kidney disease Mother     Alcohol abuse Father     Breast cancer Maternal Aunt      Social History     Socioeconomic History    Marital status:      Spouse name: Not on file    Number of children: Not on file    Years of education: 11    Highest education level: Not on file   Occupational History    Not on file   Social Needs    Financial resource strain: Not on file    Food insecurity     Worry: Not on file     Inability: Not on file    Transportation needs     Medical: Not on file     Non-medical: Not on file   Tobacco Use    Smoking status: Never Smoker    Smokeless tobacco: Never Used   Substance and Sexual Activity    Alcohol use: No     Alcohol/week: 0.0 standard drinks    Drug use: No    Sexual activity: Never     Birth control/protection: Surgical   Lifestyle    Physical activity     Days per week: Not on file     Minutes per session: Not on file    Stress: Not on file   Relationships    Social connections     Talks on phone: Not on file     Gets together: Not on file     Attends Taoist service: Not on file     Active member of club or organization: Not on file     Attends meetings of clubs or organizations: Not on file     Relationship status: Not on file   Other Topics Concern    Are you pregnant or think you may be? Not Asked    Breast-feeding Not Asked   Social History Narrative    Not on file       Imaging / Labs / Studies (reviewed on 8/24/2020):    8/24/20 X-Ray Chest PA And Lateral  TECHNIQUE:  PA and lateral views of the chest were performed.  COMPARISON:  January 16, 2017, July 21, 2014  FINDINGS:  Cardiomegaly with  "findings suggestive of possible pulmonary venous congestion versus early failure superimposed on chronic underlying interstitial lung disease.  Few scattered areas of a focal fibrosis suggested without dense consolidation or effusion.  No pneumothorax.  Stable mild prominence and indistinctness central pulmonary vasculature.  Trachea normal in position allowing for slight rotation and underlying scoliosis.  Degenerative change throughout the spine with multilevel bridging syndesmophytes..  Generalized osteopenia.  Status post cholecystectomy.  Impression: Cardiomegaly with findings suggestive of a possible early pulmonary venous congestion versus failure superimposed on underlying chronic interstitial lung disease.  No discrete area of the focal consolidation or effusion.  See above.  Follow-up as warranted based on clinical and laboratory findings.    *ordering new cervical x-ray 8/24/2020         Review of Systems:  Review of Systems   Constitutional: Negative for fever.   Eyes: Negative for blurred vision.   Respiratory: Negative for cough and wheezing.    Cardiovascular: Negative for chest pain and orthopnea.   Gastrointestinal: Negative for constipation, diarrhea, nausea and vomiting.   Genitourinary: Negative for dysuria.   Musculoskeletal: Positive for neck pain.   Skin: Negative for itching and rash.   Endo/Heme/Allergies: Does not bruise/bleed easily.         Physical Exam:  Vitals:  Vitals:    08/24/20 0757   BP: (!) 150/70   Pulse: 95   Weight: 123.1 kg (271 lb 6.2 oz)   Height: 5' 7" (1.702 m)   PainSc:   9    (reviewed on 8/24/2020)    General: alert and oriented, in no apparent distress.  Gait: In wheelchair.  Skin: no rashes, no discoloration, no obvious lesions  HEENT: normocephalic, atraumatic. Pupils equal and round.  Cardiovascular: no significant peripheral edema present.  Respiratory: O2 nasal cannula in place.    Musculoskeletal - Cervical Spine:  - Pain on flexion of cervical spine: Present "   - Pain on extension of cervical spine: Present   - Cervical facet loading: Present   - TTP over the cervical facet joints: Present   - TTP over the cervical paraspinals: Present   - Spurling's: Negative    Neuro - Upper Extremities:  - BUE Strength:R/L: D: 5/5; B: 5/5; T: 5/5; WF: 5/5; WE: 5/5; IO: 5/5  - Extremity Reflexes: Brisk and symmetric throughout  - Sensory: Sensation to light touch intact bilaterally      Psych:  Mood and affect is appropriate        Assessment  1. 65 y.o. year old patient with PMH of   Past Medical History:   Diagnosis Date    Arthritis     COPD (chronic obstructive pulmonary disease)     Diabetes mellitus, type 2     Heart attack 2005    Hypertension     ELAINA (obstructive sleep apnea)       presenting with pain located cervical spine. Diagnoses include:    ICD-10-CM ICD-9-CM   1. Cervical spondylosis  M47.812 721.0   2. Neck pain  M54.2 723.1   3. Cervical muscle pain  M54.2 723.1      2. Pain Generators / Etiology: Cervical Radiculopathy and Cervical Spondylosis  3. Failed Meds (E- Effective, NE- Not Effective):  Hydrocodone-effective  4. Physical Therapy - None  5. Psychological comorbidities - None  6. Anticoagulants / Antiplatelets: None       PLAN:  1. Interventional: None for now. can consider cervical medial branch blocks in the future.    2. Pharmacologic:   - Refill Norco 10/325 BID PRN (60 tabs) x 2 months. Paper Rx given.  Patient tolerating opioids with no side effects, obtaining good pain control with functional improvement.  We have discussed the risks of chronic opioid medication management.   *We called the pharmacy and canceled any future Rx on file.   * Also, I called Dr. Dixon's office to verify that patient will no longer be seeing their clinic.  She lives closer to us and would like to continue care with us.  She last saw them on 08/20/2028 but was not given any prescriptions.  She last had a urine drug screen with them on 06/04/2020, which was compliant.   She has been compliant prior to that as well.  - Opioid contract signed on 7/27/2020.     - [place]  reviewed and [place]. Last filled on 07/27/2020.    - Will order UDS next visit to ensure medication compliance.  Last UDS was appropriate (negative).    3. Rehabilitative: Encouraged regular exercise. Continue exercises and activities as tolerated.   She reports that she completed 1 week of at home physical therapy, and she was released because she said that was the most they could provide for her.  I am doubtful that she continues exercises at her home on her own.  Consider formal physical therapy in the future.    4. Diagnostic: None for now.    5. Follow up: 8 weeks for med refill     - This condition does not require this patient to take time off of work, and the primary goal of our Pain Management services is to improve the patient's functional capacity.   - I discussed the risks, benefits, and alternatives to potential treatment options. All questions and concerns were fully addressed today in clinic.           >>UDS:   07/27/2020 ::  Negative ( prior to starting medications)    Disclaimer:  This note was prepared using voice recognition system and is likely to have sound alike errors that may have been overlooked even after proof reading.  Please call me with any questions.

## 2020-08-25 ENCOUNTER — TELEPHONE (OUTPATIENT)
Dept: CARDIOLOGY | Facility: CLINIC | Age: 65
End: 2020-08-25

## 2020-08-25 ENCOUNTER — PATIENT MESSAGE (OUTPATIENT)
Dept: CARDIOLOGY | Facility: CLINIC | Age: 65
End: 2020-08-25

## 2020-08-25 NOTE — TELEPHONE ENCOUNTER
Called and got recording, mailbox full and can not take any messages. Cm  ----- Message from Chaitanya Cardenas MD sent at 8/25/2020 10:55 AM CDT -----  CXR reviwed, continue diuretics

## 2020-08-27 ENCOUNTER — PATIENT OUTREACH (OUTPATIENT)
Dept: ADMINISTRATIVE | Facility: HOSPITAL | Age: 65
End: 2020-08-27

## 2020-08-27 NOTE — PROGRESS NOTES
Contacted patient about overdue Mammogram and Colonoscopy Left a message requesting a call back.

## 2020-09-01 ENCOUNTER — TELEPHONE (OUTPATIENT)
Dept: INTERNAL MEDICINE | Facility: CLINIC | Age: 65
End: 2020-09-01

## 2020-09-01 ENCOUNTER — TELEPHONE (OUTPATIENT)
Dept: PAIN MEDICINE | Facility: CLINIC | Age: 65
End: 2020-09-01

## 2020-09-01 DIAGNOSIS — M19.90 ARTHRITIS: ICD-10-CM

## 2020-09-01 RX ORDER — CYCLOBENZAPRINE HCL 10 MG
10 TABLET ORAL DAILY PRN
Qty: 30 TABLET | Refills: 0 | Status: SHIPPED | OUTPATIENT
Start: 2020-09-01 | End: 2020-10-12 | Stop reason: SDUPTHER

## 2020-09-01 NOTE — TELEPHONE ENCOUNTER
Spoke with patient. Informed her every 3 months unless she is sick or having a concern. Pt expressed clear understanding.

## 2020-09-01 NOTE — TELEPHONE ENCOUNTER
----- Message from Kenyetta Montano sent at 9/1/2020  9:47 AM CDT -----  Contact: [phone]  Would like to consult with the nurse, patient needs a refill on her Rx medication, please call back thanks sj   .Type:  RX Refill Request    Who Called:  ms pimentel  Refill or New Rx:refill  RX Name and Strength:Flexeril,   How is the patient currently taking it? (ex. 1XDay): Once a day  Is this a 30 day or 90 day RX:30  Preferred Pharmacy with phone number:.    [facility] - [place], [place] - [address], [address]  [address], [address]  [place] [address]  Phone: [phone] Fax: [phone]      Local or Mail Order: local  Ordering Provider:Dr cohn  Would the patient rather a call back or a response via [facility]? callback  Best Call Back Number: [phone]  Additional Information:

## 2020-09-01 NOTE — TELEPHONE ENCOUNTER
Attempted to call patient regarding requested Rx being approved and sent to the pharmacy. No answer.

## 2020-09-01 NOTE — TELEPHONE ENCOUNTER
----- Message from Brittany King sent at 9/1/2020  9:14 AM CDT -----  States she needs to know if Dr Andrews needs to see her once, twice or three times a month. Please call pt [phone]. Thank you

## 2020-09-01 NOTE — TELEPHONE ENCOUNTER
Spoke with pt requesting a refill of a med we have not ordered in past I searched the records confirming with pt the ordering  provider pt was upset stating no our department has ordered the rx in the past .I confirmed with pt I will call pharmacy to get this matter handled spoke with pharmacy Dr Kaufman had ordered the rx with a refill I then returned  Call to pt confirming the rx and a refill was ready at the [facility] [place], [place] - [address], [address] pt verbalized all understanding and left conversation happy ----- Message from Fay Garcia sent at 9/1/2020  8:57 AM CDT -----  Type:  RX Refill Request    Who Called:  Pt  Marley   Refill or New Rx:   refill   RX Name and Strength: Flexeril 10mg   How is the patient currently taking it? (ex. 1XDay):  Takes once daily   Is this a 30 day or 90 day RX:   30 day   Preferred Pharmacy with phone number:  [facility] in [place]   Local or Mail Order:  Local   Ordering Provider:  Cameron   Would the patient rather a call back or a response via [facility]?     Call back   Best Call Back Number:  [phone]  Additional Information:  please call when sent in//bridger/joanne

## 2020-09-09 NOTE — TELEPHONE ENCOUNTER
----- Message from Rupali Carrillo sent at 9/9/2020  8:37 AM CDT -----  Regarding: pt  .Type:  RX Refill Request    Who Called: pt   Refill or New Rx:refill   RX Name and Strength:Furosemide 40mg   How is the patient currently taking it? (ex. 1XDay):1xday   Is this a 30 day or 90 day Rx:30day   Preferred Pharmacy with phone number:.      [facility] - [place], [place] - [address], [address]  [address], [address]  [place] [address]  Phone: [phone] Fax: [phone]      Local or Mail Order:local   Ordering Provider:   Would the patient rather a call back or a response via [facility]? Call back   Best Call Back Number:.[phone] (home)   Additional Information:       .Type:  RX Refill Request    Who Called: pt   Refill or New Rx:refill   RX Name and Strength:Omeprazole 40mg   How is the patient currently taking it? (ex. 1XDay):1xday   Is this a 30 day or 90 day Rx:30day   Preferred Pharmacy with phone number:.      [facility] - [place] - [address], [address]  [address]  [place] [address]  Phone: [phone] Fax: [phone]      Local or Mail Order:local   Ordering Provider:   Would the patient rather a call back or a response via [facility]? Call back   Best Call Back Number:.[phone] (home)   Additional Information:     .Type:  RX Refill Request    Who Called: pt   Refill or New Rx:Refill   RX Name and Strength:LYRICA 75 mg capsule  How is the patient currently taking it? (ex. 1XDay):1xday   Is this a 30 day or 90 day Rx:30day   Preferred Pharmacy with phone number:.      [facility] - [place], [place] - [address], [address]  [address]  [place] [address]  Phone: [phone] Fax: [phone]      Local or Mail Order:local   Ordering Provider:   Would the patient rather a call back or a response via [facility]? Call back   Best Call Back Number:.[phone] (home)   Additional Information:         Thank you,    Rupali Carrillo

## 2020-09-10 RX ORDER — FUROSEMIDE 40 MG/1
40 TABLET ORAL DAILY
Qty: 90 TABLET | Refills: 0 | Status: CANCELLED | OUTPATIENT
Start: 2020-09-10 | End: 2020-12-09

## 2020-09-10 NOTE — TELEPHONE ENCOUNTER
----- Message from Kulwinder Andrews MD sent at 9/10/2020  8:05 AM CDT -----  Ask her if she is taking torsemide and furosemide currently?  This may be too much

## 2020-09-14 ENCOUNTER — TELEPHONE (OUTPATIENT)
Dept: PAIN MEDICINE | Facility: CLINIC | Age: 65
End: 2020-09-14

## 2020-09-14 ENCOUNTER — TELEPHONE (OUTPATIENT)
Dept: INTERNAL MEDICINE | Facility: CLINIC | Age: 65
End: 2020-09-14

## 2020-09-14 NOTE — TELEPHONE ENCOUNTER
----- Message from Renetta Forte sent at 9/14/2020 11:08 AM CDT -----  Type:  Needs Medical Advice    Who Called:  SEDA WOOTEN   Symptoms (please be specific): pt did not want to say   How long has patient had these symptoms:   Pharmacy name and phone #:   Would the patient rather a call back or a response via [facility]?  Call   Best Call Back Number:  [phone] (home)    Additional Information:

## 2020-09-14 NOTE — TELEPHONE ENCOUNTER
Pt asked for refill of norco early on Saturday , as she has been taking 3-4 a day due to pain being severe . Informed pt that it is breech of contract to take more than advised . Informed pt I would ask rita as that who she saw last . Pt understood. All questions answered.   Christopher Parra MA  [facility]

## 2020-09-14 NOTE — TELEPHONE ENCOUNTER
Informed pt she can not get early refill for norco per rita . Informed pt to take every 12 hours and that she will need to wait. Pt understood. All questions answered.   Alexis Boyd,MA [facility]

## 2020-09-14 NOTE — TELEPHONE ENCOUNTER
----- Message from Brittany King sent at 9/14/2020 11:12 AM CDT -----  States she would like the nurse to give her a call back. She needs to get a refill. Please call pt [phone]. She didn't want to give the information to me. Thank you

## 2020-09-17 NOTE — TELEPHONE ENCOUNTER
----- Message from Nancy Verdinony sent at 9/17/2020  7:00 AM CDT -----  Regarding: refill/  Contact: pt  Pt states she has been requesting this for 2 weeks.     1. What is the name of the medication you are requesting? Furosemide  2. What is the dose? 40mg  3. How do you take the medication? Orally, topically, etc? .  4. How often do you take this medication? .  5. Do you need a 30 day or 90 day supply? .  6. How many refills are you requesting? .  7. What is your preferred pharmacy and location of the pharmacy? .  8. Who can we contact with further questions?    1. What is the name of the medication you are requesting? Bidil  2. What is the dose? .  3. How do you take the medication? Orally, topically, etc? .  4. How often do you take this medication? .  5. Do you need a 30 day or 90 day supply? .  6. How many refills are you requesting? .  7. What is your preferred pharmacy and location of the pharmacy? [facility]  8. Who can we contact with further questions? [phone]

## 2020-09-17 NOTE — TELEPHONE ENCOUNTER
----- Message from Kenyetta Montano sent at 9/17/2020  9:15 AM CDT -----  Regarding: refill  Contact: [phone]  Would like to consult with the nurse, patient states that she has been trying  to get a refill  on her Rx medication, patient states that no one is returning her call, patient would like to speak with the nurse as soon  possible, patient states that she has been out of t this medication for Two week  please call back thanks sj

## 2020-09-18 DIAGNOSIS — I10 ESSENTIAL HYPERTENSION: ICD-10-CM

## 2020-09-18 RX ORDER — FUROSEMIDE 40 MG/1
40 TABLET ORAL DAILY
Qty: 90 TABLET | Refills: 0 | Status: SHIPPED | OUTPATIENT
Start: 2020-09-18 | End: 2020-11-08 | Stop reason: SDUPTHER

## 2020-09-18 RX ORDER — ISOSORBIDE DINITRATE AND HYDRALAZINE HYDROCHLORIDE 37.5; 2 MG/1; MG/1
1 TABLET ORAL 3 TIMES DAILY
Qty: 90 TABLET | Refills: 1 | Status: SHIPPED | OUTPATIENT
Start: 2020-09-18 | End: 2020-10-12 | Stop reason: SDUPTHER

## 2020-09-18 NOTE — TELEPHONE ENCOUNTER
----- Message from Noemí Marion sent at 9/18/2020 10:54 AM CDT -----  Contact: self/[phone]  Type:  RX Refill Request    Who Called: Marley Malagon  Refill or New Rx:refill  RX Name and Strength:isosorbide-hydrALAZINE 20-37.5 mg (BIDIL) 20-37.5 mg Tab  How is the patient currently taking it? (ex. 1XDay):3 time a day  Is this a 30 day or 90 day RX:90  Preferred Pharmacy with phone number:  [facility] - [place] - [address]  [address], [place] [address]  Phone: [phone] Fax: [phone]  Local or Mail Order:Local  Ordering Provider:Dr Andrews  Would the patient rather a call back or a response via [facility]? Call back   Best Call Back Number:[phone]  Additional Information:

## 2020-09-18 NOTE — TELEPHONE ENCOUNTER
----- Message from Kulwinder Andrews MD sent at 9/18/2020  9:07 AM CDT -----  She prob has been requesting for 2 weeks, b/c we were supposed to find out if she was taking torsemide and lasix together, and I havent gotten a response

## 2020-09-18 NOTE — TELEPHONE ENCOUNTER
----- Message from Noemí Marion sent at 9/18/2020 11:01 AM CDT -----  Contact: self/[phone]  Patient would like to have her fluid medication refill, patient do not know the name. Please call her back at [phone]. She is out and need medication. Thanks/ar

## 2020-09-21 ENCOUNTER — TELEPHONE (OUTPATIENT)
Dept: INTERNAL MEDICINE | Facility: CLINIC | Age: 65
End: 2020-09-21

## 2020-09-21 NOTE — TELEPHONE ENCOUNTER
Spoke with patient informed her that Rx was sent on 9/18 to the pharmacy. Pt states that she has not checked with her pharmacy and will contact them to arrange  options.

## 2020-09-21 NOTE — TELEPHONE ENCOUNTER
----- Message from Rhonda Davy sent at 9/21/2020 11:57 AM CDT -----  Contact: pt  Type:  RX Refill Request    Who Called: Marley  Refill or New Rx:refill  RX Name and Strength:Bidil  How is the patient currently taking it? (ex. 1XDay):  Is this a 30 day or 90 day RX:  Preferred Pharmacy with phone number:    [facility] - [place] - [address], [place] [place]  [address], [place] [id number]  Phone: [phone] Fax: [phone]    Local or Mail Order:local  Ordering Provider:Juliana  Would the patient rather a call back or a response via [facility]? call  Best Call Back Number: [phone]  Additional Information:

## 2020-09-23 ENCOUNTER — PATIENT OUTREACH (OUTPATIENT)
Dept: ADMINISTRATIVE | Facility: OTHER | Age: 65
End: 2020-09-23

## 2020-09-23 NOTE — PROGRESS NOTES
Chart Reviewed  Care Everywhere updated  Immunizations reconciled  Health Maintenance updated  Ordered:  Upcoming:

## 2020-09-24 ENCOUNTER — TELEPHONE (OUTPATIENT)
Dept: INTERNAL MEDICINE | Facility: CLINIC | Age: 65
End: 2020-09-24

## 2020-09-24 RX ORDER — FLUTICASONE FUROATE AND VILANTEROL 100; 25 UG/1; UG/1
1 POWDER RESPIRATORY (INHALATION) DAILY
Qty: 30 EACH | Refills: 0 | Status: SHIPPED | OUTPATIENT
Start: 2020-09-24 | End: 2020-10-24

## 2020-09-24 RX ORDER — ALBUTEROL SULFATE 90 UG/1
2 AEROSOL, METERED RESPIRATORY (INHALATION)
Qty: 18 G | Refills: 1 | Status: CANCELLED | OUTPATIENT
Start: 2020-09-24 | End: 2021-09-24

## 2020-09-24 NOTE — TELEPHONE ENCOUNTER
----- Message from Kulwinder Andrews MD sent at 9/24/2020 12:13 PM CDT -----  Drew pt an appt tomm or next week. HTN and chronic issues

## 2020-09-24 NOTE — TELEPHONE ENCOUNTER
Spoke with patient informed her that she needs to schedule appt to f/u on chronic issues. Pt refused. States that it has not been 3 months and she does not want to make a appointment. States that she has more then one doctor and does not feel the need to make a appointment at this time.

## 2020-09-24 NOTE — TELEPHONE ENCOUNTER
----- Message from Yoselyn Carreon sent at 9/24/2020  3:27 PM CDT -----  Contact: self/[phone]  Type:  RX Refill Request    Who Called: Patient  Refill or New Rx:refill  RX Name and Strength:Troair HFA AER  How is the patient currently taking it? (ex. 1XDay) 2 X Day  Is this a 30 day or 90 day RX:30  Preferred Pharmacy with phone number:  [facility] - [place], [place] - [address], [facility] [address]  [address], [facility] [address]  [place] [address]  Phone: [phone] Fax: [phone]    Local or Mail Order:Local  Ordering Provider:Dr Andrews  Would the patient rather a call back or a response via [facility]? Call Back  Best Call Back Number:[phone]  Additional Information: Patient would like to speak with nurse regarding He Inhaler. Patient states that the inhaler runs out before the end of the month. Patient states that she uses her inhaler more than twice per day    Thanks/GARY

## 2020-09-24 NOTE — TELEPHONE ENCOUNTER
----- Message from Mira Jasmine sent at 9/24/2020 10:48 AM CDT -----  Pt is calling to speak with the office regarding an new inhaler prescription. Please call back at [phone]

## 2020-09-29 ENCOUNTER — TELEPHONE (OUTPATIENT)
Dept: INTERNAL MEDICINE | Facility: CLINIC | Age: 65
End: 2020-09-29

## 2020-09-29 NOTE — TELEPHONE ENCOUNTER
Attempted to call patient regarding message. No answer, lvm       ----- Message from Boby Blanchard sent at 9/29/2020 12:19 PM CDT -----  Contact: self  Would like to consult with nurse regarding her Lyrica 75 mg.  Pt states she does not have anymore.  Please contact Marley Malagon @ [phone].  Pt states she also needs her Covid Test Results.  Thanks/As

## 2020-09-30 NOTE — TELEPHONE ENCOUNTER
----- Message from Roberta Nunez sent at 9/30/2020  9:48 AM CDT -----  Regarding: PATIENT IS IN THE HOSPITAL  Contact: PATIENT

## 2020-10-06 ENCOUNTER — TELEPHONE (OUTPATIENT)
Dept: INTERNAL MEDICINE | Facility: CLINIC | Age: 65
End: 2020-10-06

## 2020-10-06 ENCOUNTER — TELEPHONE (OUTPATIENT)
Dept: PAIN MEDICINE | Facility: CLINIC | Age: 65
End: 2020-10-06

## 2020-10-06 NOTE — TELEPHONE ENCOUNTER
Spoke with patient, states that she is needing refills for Lyrica. Pt informed me that she is under the care of a pain management physician and elen contact their office for renewal.

## 2020-10-06 NOTE — TELEPHONE ENCOUNTER
----- Message from Britt Salvador sent at 10/6/2020  8:08 AM CDT -----  Contact: Marley Church called in regards to Lyrica & Humalog insulin pen prescription not being called in for two weeks. Please send medication to:    [facility] - [place] - [address], [place]  [address], [place] [address]  Phone: [phone] Fax: [phone]     If any questions, please call back at [phone]. Thanks jh

## 2020-10-06 NOTE — TELEPHONE ENCOUNTER
----- Message from Britt Jon sent at 10/6/2020  9:10 AM CDT -----  Type:  RX Refill Request    Who Called: pt   Refill or New Rx:refill  RX Name and Strength:LYRICA 75 mg capsule  How is the patient currently taking it? (ex. 1XDay):1XDaily   Is this a 30 day or 90 day RX:30  Preferred Pharmacy with phone number:  Local or Mail Order:local   Ordering Provider:cheo   Would the patient rather a call back or a response via [facility]? Callback   Best Call Back Number:[phone]   Additional Information:       [facility] - [place] - [address], [place]  [address], [place] [address]  Phone: [phone] Fax: [phone]

## 2020-10-06 NOTE — TELEPHONE ENCOUNTER
----- Message from Nancy Chapin sent at 10/6/2020 12:56 PM CDT -----  Regarding: please call  Contact: pt  Needs recommendation for home health. [phone]

## 2020-10-09 ENCOUNTER — PES CALL (OUTPATIENT)
Dept: ADMINISTRATIVE | Facility: CLINIC | Age: 65
End: 2020-10-09

## 2020-10-12 ENCOUNTER — TELEPHONE (OUTPATIENT)
Dept: INTERNAL MEDICINE | Facility: CLINIC | Age: 65
End: 2020-10-12

## 2020-10-12 ENCOUNTER — OFFICE VISIT (OUTPATIENT)
Dept: OPHTHALMOLOGY | Facility: CLINIC | Age: 65
End: 2020-10-12
Payer: MEDICARE

## 2020-10-12 ENCOUNTER — OFFICE VISIT (OUTPATIENT)
Dept: INTERNAL MEDICINE | Facility: CLINIC | Age: 65
End: 2020-10-12
Payer: MEDICARE

## 2020-10-12 ENCOUNTER — LAB VISIT (OUTPATIENT)
Dept: LAB | Facility: HOSPITAL | Age: 65
End: 2020-10-12
Attending: FAMILY MEDICINE
Payer: MEDICARE

## 2020-10-12 VITALS
HEART RATE: 84 BPM | TEMPERATURE: 98 F | BODY MASS INDEX: 40.66 KG/M2 | WEIGHT: 259.06 LBS | SYSTOLIC BLOOD PRESSURE: 130 MMHG | HEIGHT: 67 IN | OXYGEN SATURATION: 88 % | DIASTOLIC BLOOD PRESSURE: 82 MMHG

## 2020-10-12 DIAGNOSIS — E11.3292 TYPE 2 DIABETES MELLITUS WITH LEFT EYE AFFECTED BY MILD NONPROLIFERATIVE RETINOPATHY WITHOUT MACULAR EDEMA, WITH LONG-TERM CURRENT USE OF INSULIN: ICD-10-CM

## 2020-10-12 DIAGNOSIS — E11.8 TYPE 2 DIABETES MELLITUS WITH COMPLICATION, WITH LONG-TERM CURRENT USE OF INSULIN: ICD-10-CM

## 2020-10-12 DIAGNOSIS — I50.42 CHRONIC COMBINED SYSTOLIC AND DIASTOLIC CONGESTIVE HEART FAILURE: ICD-10-CM

## 2020-10-12 DIAGNOSIS — Z79.4 TYPE 2 DIABETES MELLITUS WITH LEFT EYE AFFECTED BY MILD NONPROLIFERATIVE RETINOPATHY WITHOUT MACULAR EDEMA, WITH LONG-TERM CURRENT USE OF INSULIN: ICD-10-CM

## 2020-10-12 DIAGNOSIS — E11.36 DIABETIC CATARACT OF RIGHT EYE: ICD-10-CM

## 2020-10-12 DIAGNOSIS — M19.90 ARTHRITIS: ICD-10-CM

## 2020-10-12 DIAGNOSIS — G89.29 OTHER CHRONIC PAIN: ICD-10-CM

## 2020-10-12 DIAGNOSIS — Z79.4 TYPE 2 DIABETES MELLITUS WITH RIGHT EYE AFFECTED BY MODERATE NONPROLIFERATIVE RETINOPATHY WITHOUT MACULAR EDEMA, WITH LONG-TERM CURRENT USE OF INSULIN: Primary | ICD-10-CM

## 2020-10-12 DIAGNOSIS — I10 ESSENTIAL HYPERTENSION: ICD-10-CM

## 2020-10-12 DIAGNOSIS — Z79.4 TYPE 2 DIABETES MELLITUS WITH COMPLICATION, WITH LONG-TERM CURRENT USE OF INSULIN: Primary | ICD-10-CM

## 2020-10-12 DIAGNOSIS — Z79.4 TYPE 2 DIABETES MELLITUS WITH COMPLICATION, WITH LONG-TERM CURRENT USE OF INSULIN: ICD-10-CM

## 2020-10-12 DIAGNOSIS — E11.3391 TYPE 2 DIABETES MELLITUS WITH RIGHT EYE AFFECTED BY MODERATE NONPROLIFERATIVE RETINOPATHY WITHOUT MACULAR EDEMA, WITH LONG-TERM CURRENT USE OF INSULIN: Primary | ICD-10-CM

## 2020-10-12 DIAGNOSIS — H52.7 REFRACTIVE DISORDER: ICD-10-CM

## 2020-10-12 DIAGNOSIS — Z96.1 PSEUDOPHAKIA, LEFT EYE: ICD-10-CM

## 2020-10-12 DIAGNOSIS — E11.8 TYPE 2 DIABETES MELLITUS WITH COMPLICATION, WITH LONG-TERM CURRENT USE OF INSULIN: Primary | ICD-10-CM

## 2020-10-12 DIAGNOSIS — I50.9 CONGESTIVE HEART FAILURE, UNSPECIFIED HF CHRONICITY, UNSPECIFIED HEART FAILURE TYPE: ICD-10-CM

## 2020-10-12 DIAGNOSIS — J44.9 CHRONIC OBSTRUCTIVE PULMONARY DISEASE, UNSPECIFIED COPD TYPE: ICD-10-CM

## 2020-10-12 PROBLEM — J18.9 PNEUMONIA DUE TO INFECTIOUS ORGANISM: Status: RESOLVED | Noted: 2020-07-17 | Resolved: 2020-10-12

## 2020-10-12 PROBLEM — K13.79 PAIN IN ORAL CAVITY: Status: RESOLVED | Noted: 2017-07-26 | Resolved: 2020-10-12

## 2020-10-12 PROCEDURE — 90694 VACC AIIV4 NO PRSRV 0.5ML IM: CPT | Mod: S$GLB,,, | Performed by: FAMILY MEDICINE

## 2020-10-12 PROCEDURE — 90694 FLU VACCINE - QUADRIVALENT - ADJUVANTED: ICD-10-PCS | Mod: S$GLB,,, | Performed by: FAMILY MEDICINE

## 2020-10-12 PROCEDURE — 3079F PR MOST RECENT DIASTOLIC BLOOD PRESSURE 80-89 MM HG: ICD-10-PCS | Mod: CPTII,S$GLB,, | Performed by: FAMILY MEDICINE

## 2020-10-12 PROCEDURE — 99499 UNLISTED E&M SERVICE: CPT | Mod: S$GLB,,, | Performed by: FAMILY MEDICINE

## 2020-10-12 PROCEDURE — 3008F BODY MASS INDEX DOCD: CPT | Mod: CPTII,S$GLB,, | Performed by: FAMILY MEDICINE

## 2020-10-12 PROCEDURE — 99999 PR PBB SHADOW E&M-EST. PATIENT-LVL IV: ICD-10-PCS | Mod: PBBFAC,,, | Performed by: STUDENT IN AN ORGANIZED HEALTH CARE EDUCATION/TRAINING PROGRAM

## 2020-10-12 PROCEDURE — 99499 UNLISTED E&M SERVICE: CPT | Mod: S$GLB,,, | Performed by: STUDENT IN AN ORGANIZED HEALTH CARE EDUCATION/TRAINING PROGRAM

## 2020-10-12 PROCEDURE — 99214 PR OFFICE/OUTPT VISIT, EST, LEVL IV, 30-39 MIN: ICD-10-PCS | Mod: 25,S$GLB,, | Performed by: FAMILY MEDICINE

## 2020-10-12 PROCEDURE — G0008 ADMIN INFLUENZA VIRUS VAC: HCPCS | Mod: S$GLB,,, | Performed by: FAMILY MEDICINE

## 2020-10-12 PROCEDURE — 3046F HEMOGLOBIN A1C LEVEL >9.0%: CPT | Mod: CPTII,S$GLB,, | Performed by: FAMILY MEDICINE

## 2020-10-12 PROCEDURE — 92015 PR REFRACTION: ICD-10-PCS | Mod: S$GLB,,, | Performed by: STUDENT IN AN ORGANIZED HEALTH CARE EDUCATION/TRAINING PROGRAM

## 2020-10-12 PROCEDURE — 99214 OFFICE O/P EST MOD 30 MIN: CPT | Mod: 25,S$GLB,, | Performed by: FAMILY MEDICINE

## 2020-10-12 PROCEDURE — 99499 RISK ADDL DX/OHS AUDIT: ICD-10-PCS | Mod: S$GLB,,, | Performed by: STUDENT IN AN ORGANIZED HEALTH CARE EDUCATION/TRAINING PROGRAM

## 2020-10-12 PROCEDURE — 1101F PT FALLS ASSESS-DOCD LE1/YR: CPT | Mod: CPTII,S$GLB,, | Performed by: FAMILY MEDICINE

## 2020-10-12 PROCEDURE — 92015 DETERMINE REFRACTIVE STATE: CPT | Mod: S$GLB,,, | Performed by: STUDENT IN AN ORGANIZED HEALTH CARE EDUCATION/TRAINING PROGRAM

## 2020-10-12 PROCEDURE — 36415 COLL VENOUS BLD VENIPUNCTURE: CPT | Mod: PO

## 2020-10-12 PROCEDURE — 83036 HEMOGLOBIN GLYCOSYLATED A1C: CPT

## 2020-10-12 PROCEDURE — 3079F DIAST BP 80-89 MM HG: CPT | Mod: CPTII,S$GLB,, | Performed by: FAMILY MEDICINE

## 2020-10-12 PROCEDURE — 1101F PR PT FALLS ASSESS DOC 0-1 FALLS W/OUT INJ PAST YR: ICD-10-PCS | Mod: CPTII,S$GLB,, | Performed by: FAMILY MEDICINE

## 2020-10-12 PROCEDURE — 99499 RISK ADDL DX/OHS AUDIT: ICD-10-PCS | Mod: S$GLB,,, | Performed by: FAMILY MEDICINE

## 2020-10-12 PROCEDURE — 99999 PR PBB SHADOW E&M-EST. PATIENT-LVL V: ICD-10-PCS | Mod: PBBFAC,,, | Performed by: FAMILY MEDICINE

## 2020-10-12 PROCEDURE — 99999 PR PBB SHADOW E&M-EST. PATIENT-LVL IV: CPT | Mod: PBBFAC,,, | Performed by: STUDENT IN AN ORGANIZED HEALTH CARE EDUCATION/TRAINING PROGRAM

## 2020-10-12 PROCEDURE — 3008F PR BODY MASS INDEX (BMI) DOCUMENTED: ICD-10-PCS | Mod: CPTII,S$GLB,, | Performed by: FAMILY MEDICINE

## 2020-10-12 PROCEDURE — 3075F SYST BP GE 130 - 139MM HG: CPT | Mod: CPTII,S$GLB,, | Performed by: FAMILY MEDICINE

## 2020-10-12 PROCEDURE — 99499 UNLISTED E&M SERVICE: CPT | Mod: ,,, | Performed by: FAMILY MEDICINE

## 2020-10-12 PROCEDURE — 99999 PR PBB SHADOW E&M-EST. PATIENT-LVL V: CPT | Mod: PBBFAC,,, | Performed by: FAMILY MEDICINE

## 2020-10-12 PROCEDURE — 3046F PR MOST RECENT HEMOGLOBIN A1C LEVEL > 9.0%: ICD-10-PCS | Mod: CPTII,S$GLB,, | Performed by: FAMILY MEDICINE

## 2020-10-12 PROCEDURE — G0008 FLU VACCINE - QUADRIVALENT - ADJUVANTED: ICD-10-PCS | Mod: S$GLB,,, | Performed by: FAMILY MEDICINE

## 2020-10-12 PROCEDURE — 99499 RISK ADDL DX/OHS AUDIT: ICD-10-PCS | Mod: ,,, | Performed by: FAMILY MEDICINE

## 2020-10-12 PROCEDURE — 92004 PR EYE EXAM, NEW PATIENT,COMPREHESV: ICD-10-PCS | Mod: S$GLB,,, | Performed by: STUDENT IN AN ORGANIZED HEALTH CARE EDUCATION/TRAINING PROGRAM

## 2020-10-12 PROCEDURE — 92004 COMPRE OPH EXAM NEW PT 1/>: CPT | Mod: S$GLB,,, | Performed by: STUDENT IN AN ORGANIZED HEALTH CARE EDUCATION/TRAINING PROGRAM

## 2020-10-12 PROCEDURE — 3075F PR MOST RECENT SYSTOLIC BLOOD PRESS GE 130-139MM HG: ICD-10-PCS | Mod: CPTII,S$GLB,, | Performed by: FAMILY MEDICINE

## 2020-10-12 RX ORDER — METOPROLOL SUCCINATE 100 MG/1
100 TABLET, EXTENDED RELEASE ORAL DAILY
Qty: 90 TABLET | Refills: 0 | Status: SHIPPED | OUTPATIENT
Start: 2020-10-12

## 2020-10-12 RX ORDER — CYCLOBENZAPRINE HCL 10 MG
10 TABLET ORAL DAILY PRN
Qty: 30 TABLET | Refills: 1 | Status: SHIPPED | OUTPATIENT
Start: 2020-10-12

## 2020-10-12 RX ORDER — ISOSORBIDE DINITRATE AND HYDRALAZINE HYDROCHLORIDE 37.5; 2 MG/1; MG/1
1 TABLET ORAL 3 TIMES DAILY
Qty: 90 TABLET | Refills: 1 | Status: SHIPPED | OUTPATIENT
Start: 2020-10-12 | End: 2020-12-11

## 2020-10-12 RX ORDER — SEMAGLUTIDE 1.34 MG/ML
0.5 INJECTION, SOLUTION SUBCUTANEOUS
Qty: 4.5 ML | Refills: 0 | Status: SHIPPED | OUTPATIENT
Start: 2020-10-12

## 2020-10-12 RX ORDER — OMEPRAZOLE 40 MG/1
40 CAPSULE, DELAYED RELEASE ORAL EVERY MORNING
Qty: 30 CAPSULE | Refills: 1 | Status: SHIPPED | OUTPATIENT
Start: 2020-10-12 | End: 2021-01-08

## 2020-10-12 NOTE — TELEPHONE ENCOUNTER
----- Message from Jennifer Yoon sent at 10/12/2020  2:11 PM CDT -----  Regarding: pt  Type:  Patient Returning Call    Who Called:pt  Who Left Message for Patient:nurse  Does the patient know what this is regarding?:call return  Would the patient rather a call back or a response via [facility]? Call back  Best Call Back Number:[phone]  Additional Information:

## 2020-10-12 NOTE — TELEPHONE ENCOUNTER
Attempted to call patient regarding home health referral. No answer, [facility] is not in network with [employer] insurance at this time. We will not be able to accept this referral.

## 2020-10-12 NOTE — PROGRESS NOTES
HPI      The patient is here for her annual DM eye exam. The patients last A1C   was 11.1. The patient states her eyes blurred, but she lost her previous   glasses.     NP- Referred by     1. +DM  2. +HTN  3. Refractive Error  4. PCIOL OS   NSC OD    Last edited by Darshana Rush on 10/12/2020  9:14 AM. (History)            Assessment /Plan     For exam results, see Encounter Report.    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  Reviewed diabetic eye precautions including avoiding tobacco use, glucose control, and importance of regular follow up.     Type 2 diabetes mellitus with left eye affected by mild nonproliferative retinopathy without macular edema, with long-term current use of insulin- See Above    Diabetic cataract of right eye- Follow    Pseudophakia, left eye- Stable, follow    Refractive disorder- MR dispensed today       Return to clinic in 2 months for dilation and MOCT

## 2020-10-12 NOTE — TELEPHONE ENCOUNTER
----- Message from Aaron Alvarado sent at 10/12/2020  2:43 PM CDT -----  Regarding: Home Health update  Please call Marley to discuss information she has about home health [phone] ([place]).

## 2020-10-12 NOTE — LETTER
October 12, 2020      Kulwinder Andrews MD  [address] [place] [address]           [place] [facility]  [address] [place] [address]  Phone: [phone]  Fax: [phone]          Patient: Marley Malagon   MR Number: [id number]   YOB: 1955   Date of Visit: 10/12/2020       Dear Dr. Kulwinder Andrews:    Thank you for referring Marley Malagon to me for evaluation. Attached you will find relevant portions of my assessment and plan of care.    If you have questions, please do not hesitate to call me. I look forward to following Marley Malagon along with you.    Sincerely,    Loyda Graham MD    Enclosure  CC:  No Recipients    If you would like to receive this communication electronically, please contact [email or web address] or [phone] to request more information on [employer] Link access.    For providers and/or their staff who would like to refer a patient to [facility], please contact us through our one-stop-shop provider referral line, [facility], at [phone].    If you feel you have received this communication in error or would no longer like to receive these types of communications, please e-mail [email or web address]

## 2020-10-12 NOTE — TELEPHONE ENCOUNTER
----- Message from Fay Garcia sent at 10/12/2020  1:58 PM CDT -----  Type:  Needs Medical Advice    Who Called:  Pt  Marley   Symptoms (please be specific):   Pt had an appt today and her blood pressure med was not sent in//med is Bidil????????   How long has patient had these symptoms:     Pharmacy name and phone #:   [facility] in [place], [place]   Would the patient rather a call back or a response via [facility]?  Call back   Best Call Back Number:    [phone]  Additional Information:  please call///thanks/inez

## 2020-10-12 NOTE — TELEPHONE ENCOUNTER
Spoke with patient regarding message. Informed her that requested Rx had been sent to the pharmacy.

## 2020-10-12 NOTE — PROGRESS NOTES
Subjective:       Patient ID: Marley Malagon is a 65 y.o. female.    Chief Complaint: Hospital Follow Up    Diabetes  She presents for her follow-up diabetic visit. She has type 2 diabetes mellitus. Her disease course has been stable. Pertinent negatives for hypoglycemia include no confusion, dizziness, headaches or hunger. Pertinent negatives for diabetes include no blurred vision, no chest pain, no fatigue and no visual change. Pertinent negatives for hypoglycemia complications include no blackouts and no hospitalization. Pertinent negatives for diabetic complications include no CVA. Risk factors for coronary artery disease include diabetes mellitus, hypertension and obesity. Current diabetic treatment includes insulin injections (ozempic). She is compliant with treatment all of the time. There is no change in her home blood glucose trend.     Review of Systems   Constitutional: Negative for fatigue.   Eyes: Negative for blurred vision.   Respiratory: Positive for shortness of breath.    Cardiovascular: Negative for chest pain.   Gastrointestinal: Negative for abdominal pain.   Neurological: Negative for dizziness and headaches.   Psychiatric/Behavioral: Negative for confusion.       Objective:      Physical Exam  Vitals signs and nursing note reviewed.   Constitutional:       General: She is in acute distress.      Appearance: Normal appearance. She is well-developed. She is obese. She is not diaphoretic.   HENT:      Head: Normocephalic and atraumatic.      Nose: Nose normal.   Pulmonary:      Effort: Respiratory distress present.      Breath sounds: Decreased air movement present. Wheezing and rhonchi present.      Comments: Nasal canula in place  Abdominal:      General: There is no distension.      Palpations: Abdomen is soft.      Tenderness: There is no abdominal tenderness. There is no guarding.   Skin:     General: Skin is warm and dry.      Findings: No erythema or rash.   Neurological:      Mental  Status: She is alert. Mental status is at baseline.         Assessment:       1. Type 2 diabetes mellitus with complication, with long-term current use of insulin    2. Essential hypertension    3. Arthritis    4. Chronic obstructive pulmonary disease, unspecified COPD type    5. Congestive heart failure, unspecified HF chronicity, unspecified heart failure type    6. Other chronic pain    7. Chronic combined systolic and diastolic congestive heart failure        Plan:     Problem List Items Addressed This Visit        Neuro    Chronic pain       Pulmonary    Chronic obstructive pulmonary disease    Relevant Orders    Ambulatory referral/consult to Pulmonology    Ambulatory referral/consult to Home Health       Cardiac/Vascular    Essential hypertension    Relevant Medications    isosorbide-hydrALAZINE 20-37.5 mg (BIDIL) 20-37.5 mg Tab    Chronic combined systolic and diastolic congestive heart failure    Relevant Orders    Ambulatory referral/consult to Home Health    Congestive heart failure    Relevant Medications    metoprolol succinate (TOPROL-XL) 100 MG 24 hr tablet    Other Relevant Orders    Ambulatory referral/consult to Home Health    Ambulatory referral/consult to Home Health       Endocrine    Type 2 diabetes mellitus with complication, with long-term current use of insulin - Primary    Relevant Medications    semaglutide (OZEMPIC) 0.25 mg or 0.5 mg(2 mg/1.5 mL) PnIj    Other Relevant Orders    Ambulatory referral/consult to Optometry    Hemoglobin A1C    Ambulatory referral/consult to Podiatry       Orthopedic    Arthritis    Relevant Medications    cyclobenzaprine (FLEXERIL) 10 MG tablet

## 2020-10-13 ENCOUNTER — TELEPHONE (OUTPATIENT)
Dept: INTERNAL MEDICINE | Facility: CLINIC | Age: 65
End: 2020-10-13

## 2020-10-13 DIAGNOSIS — N18.30 STAGE 3 CHRONIC KIDNEY DISEASE, UNSPECIFIED WHETHER STAGE 3A OR 3B CKD: ICD-10-CM

## 2020-10-13 DIAGNOSIS — R06.00 DYSPNEA, UNSPECIFIED TYPE: ICD-10-CM

## 2020-10-13 DIAGNOSIS — J44.9 CHRONIC OBSTRUCTIVE PULMONARY DISEASE, UNSPECIFIED COPD TYPE: Primary | ICD-10-CM

## 2020-10-13 DIAGNOSIS — I50.42 CHRONIC COMBINED SYSTOLIC AND DIASTOLIC CONGESTIVE HEART FAILURE: ICD-10-CM

## 2020-10-13 LAB
ESTIMATED AVG GLUCOSE: 223 MG/DL (ref 68–131)
HBA1C MFR BLD HPLC: 9.4 % (ref 4–5.6)

## 2020-10-13 NOTE — TELEPHONE ENCOUNTER
Spoke with patient regarding home health information. States that she contacted her insurance and she was told that [facility] is covered under her health plan. [phone].    ----- Message from Arabella Rodriguez sent at 10/13/2020  8:46 AM CDT -----  Contact: Marley Roach would like a call back at [phone], To discuss more about doing Home Health.    Thanks  Td

## 2020-10-13 NOTE — PROGRESS NOTES
Please call pt with abnormal results. Pt does not need appt at this time, unless they have questions or wish to further discuss.  Improved a1c, needs to cont ozempic at 0.5mg.

## 2020-10-13 NOTE — TELEPHONE ENCOUNTER
[facility] with patient [facility]. Verified with [email or web address] that the do accept insurance. Please place referral for home health.     Company fax number is [phone]

## 2020-10-19 ENCOUNTER — OFFICE VISIT (OUTPATIENT)
Dept: PAIN MEDICINE | Facility: CLINIC | Age: 65
End: 2020-10-19
Payer: MEDICARE

## 2020-10-19 VITALS — HEIGHT: 67 IN | BODY MASS INDEX: 40.65 KG/M2 | RESPIRATION RATE: 18 BRPM | WEIGHT: 259 LBS

## 2020-10-19 DIAGNOSIS — M19.90 ARTHRITIS: ICD-10-CM

## 2020-10-19 DIAGNOSIS — M54.2 CERVICAL MUSCLE PAIN: ICD-10-CM

## 2020-10-19 DIAGNOSIS — M54.2 NECK PAIN: ICD-10-CM

## 2020-10-19 DIAGNOSIS — M47.812 CERVICAL SPONDYLOSIS: Primary | ICD-10-CM

## 2020-10-19 PROCEDURE — 3008F PR BODY MASS INDEX (BMI) DOCUMENTED: ICD-10-PCS | Mod: CPTII,S$GLB,, | Performed by: PHYSICIAN ASSISTANT

## 2020-10-19 PROCEDURE — 99214 PR OFFICE/OUTPT VISIT, EST, LEVL IV, 30-39 MIN: ICD-10-PCS | Mod: S$GLB,,, | Performed by: PHYSICIAN ASSISTANT

## 2020-10-19 PROCEDURE — 99999 PR PBB SHADOW E&M-EST. PATIENT-LVL II: CPT | Mod: PBBFAC,,, | Performed by: PHYSICIAN ASSISTANT

## 2020-10-19 PROCEDURE — 99999 PR PBB SHADOW E&M-EST. PATIENT-LVL II: ICD-10-PCS | Mod: PBBFAC,,, | Performed by: PHYSICIAN ASSISTANT

## 2020-10-19 PROCEDURE — 99214 OFFICE O/P EST MOD 30 MIN: CPT | Mod: S$GLB,,, | Performed by: PHYSICIAN ASSISTANT

## 2020-10-19 PROCEDURE — 99499 UNLISTED E&M SERVICE: CPT | Mod: S$GLB,,, | Performed by: PHYSICIAN ASSISTANT

## 2020-10-19 PROCEDURE — 1101F PR PT FALLS ASSESS DOC 0-1 FALLS W/OUT INJ PAST YR: ICD-10-PCS | Mod: CPTII,S$GLB,, | Performed by: PHYSICIAN ASSISTANT

## 2020-10-19 PROCEDURE — 1101F PT FALLS ASSESS-DOCD LE1/YR: CPT | Mod: CPTII,S$GLB,, | Performed by: PHYSICIAN ASSISTANT

## 2020-10-19 PROCEDURE — 99499 RISK ADDL DX/OHS AUDIT: ICD-10-PCS | Mod: S$GLB,,, | Performed by: PHYSICIAN ASSISTANT

## 2020-10-19 PROCEDURE — 3008F BODY MASS INDEX DOCD: CPT | Mod: CPTII,S$GLB,, | Performed by: PHYSICIAN ASSISTANT

## 2020-10-19 RX ORDER — PREGABALIN 75 MG/1
75 CAPSULE ORAL 2 TIMES DAILY
Qty: 60 CAPSULE | Refills: 0 | Status: SHIPPED | OUTPATIENT
Start: 2020-10-19 | End: 2020-11-17

## 2020-10-19 RX ORDER — HYDROCODONE BITARTRATE AND ACETAMINOPHEN 10; 325 MG/1; MG/1
1 TABLET ORAL EVERY 12 HOURS PRN
Qty: 60 TABLET | Refills: 0 | Status: SHIPPED | OUTPATIENT
Start: 2020-10-23 | End: 2020-11-17 | Stop reason: SDUPTHER

## 2020-10-19 NOTE — PROGRESS NOTES
Chief Pain Complaint:  Cervical Spine Pain (C-spine)    Interval History (10/19/2020): Patient was last seen on 8/24/2020. At that visit, the plan was to continue medication.  No changes since last visit.  Patient reports pain as 9/10 today.    Interval History (8/24/2020): Patient was last seen on 7/27/2020. At that visit, the plan was to start Norco 10/325 BID PRN, which she had previously been taking from Dr. Dixon. She wants to continue coming to our clinic since it is closer to her home.   Patient reports pain as 9/10 today.    Initial HPI (7/27/2020):  This patient is a 65 y.o. female who presents today complaining of the above noted pain/s. The patient describes the pain as follows.  Ms. Malagon is new patient clinic with complaints of neck and low back pain.  She reports having had these symptoms for approximately 2 years with no known inciting event.  Today she rates her neck pain as a 10/10 in her low back pain is rated as a 9/10.  She describes aching, throbbing, stabbing sensation which is worse with movement and standing improved with medications and rest.  She has tried numerous medications including ibuprofen, Lyrica, Lortab, Flexeril, Mobic.  She has also used a heating pad which has not been helpful.  She reports having arthritis from the neck all the way down to her low back and has previously seen an outside pain doctor however she reports that she was having to get someone to drive for in is 2 4 to drive and too expensive for her to continue going to that clinic.  They had maintained her with muscle relaxers and Lortab which she takes Lortab approximately 1-2 tablets per day of the 10/325 mg prescriptions.  She has had a lumbar epidural steroid injections several years ago which she did find to be helpful.  She has a referral for physical therapy which she will be starting in the next several days.  She endorses having numbness in her hands bilaterally almost of her pain is located in the  cervical spine.  She is accompanied with a friend today to has endorse having had lumbar radiofrequency ablation in the past and how helpful this has been for her.    Previous Therapy:  Medications:  Motrin, Lyrica, hydrocodone, Flexeril  Injections: Lumbar ANDREA in the past  Surgeries: No spinal surgery   Physical Therapy: None         Past Surgical History:   Procedure Laterality Date    cardiac stent      CHOLECYSTECTOMY      HYSTERECTOMY      fibroids    OOPHORECTOMY           Imaging / Labs / Studies (reviewed on 10/19/2020):    8/24/20 X-Ray Chest PA And Lateral  TECHNIQUE:  PA and lateral views of the chest were performed.  COMPARISON:  January 16, 2017, July 21, 2014  FINDINGS:  Cardiomegaly with findings suggestive of possible pulmonary venous congestion versus early failure superimposed on chronic underlying interstitial lung disease.  Few scattered areas of a focal fibrosis suggested without dense consolidation or effusion.  No pneumothorax.  Stable mild prominence and indistinctness central pulmonary vasculature.  Trachea normal in position allowing for slight rotation and underlying scoliosis.  Degenerative change throughout the spine with multilevel bridging syndesmophytes..  Generalized osteopenia.  Status post cholecystectomy.  Impression: Cardiomegaly with findings suggestive of a possible early pulmonary venous congestion versus failure superimposed on underlying chronic interstitial lung disease.  No discrete area of the focal consolidation or effusion.  See above.  Follow-up as warranted based on clinical and laboratory findings.    *ordering new cervical x-ray 8/24/2020         Review of Systems:  Review of Systems   Constitutional: Negative for fever.   Eyes: Negative for blurred vision.   Respiratory: Negative for cough and wheezing.    Cardiovascular: Negative for chest pain and orthopnea.   Gastrointestinal: Negative for constipation, diarrhea, nausea and vomiting.   Genitourinary:  "Negative for dysuria.   Musculoskeletal: Positive for neck pain.   Skin: Negative for itching and rash.   Endo/Heme/Allergies: Does not bruise/bleed easily.         Physical Exam:  Vitals:  Vitals:    10/19/20 0958   Resp: 18   Weight: 117.5 kg (259 lb)   Height: 5' 7" (1.702 m)   PainSc:   9   PainLoc: Neck    (reviewed on 10/19/2020)    General: alert and oriented, in no apparent distress.  Gait: In wheelchair.  Skin: no rashes, no discoloration, no obvious lesions  HEENT: normocephalic, atraumatic. Pupils equal and round.  Cardiovascular: no significant peripheral edema present.  Respiratory: O2 nasal cannula in place. Labored breathing     Musculoskeletal - Cervical Spine:   - Limited ROM secondary to pain reproduction   - Pain on flexion of cervical spine: Present   - Pain on extension of cervical spine: Present   - Cervical facet loading: Present   - TTP over the cervical facet joints: Present   - TTP over the cervical paraspinals: Present   - Spurling's: Negative    Neuro - Upper Extremities:  - BUE Strength:R/L: D: 5/5; B: 5/5; T: 5/5; WF: 5/5; WE: 5/5; IO: 5/5  - Extremity Reflexes: Brisk and symmetric throughout  - Sensory: Sensation to light touch intact bilaterally      Psych:  Mood and affect is appropriate        Assessment  1. 65 y.o. year old patient with PMH of   Past Medical History:   Diagnosis Date    Arthritis     COPD (chronic obstructive pulmonary disease)     Dental caries 4/21/2016    Diabetes mellitus, type 2     Heart attack 2005    Hypertension     ELAINA (obstructive sleep apnea)     Pain in oral cavity 7/26/2017    Pneumonia due to infectious organism 7/17/2020      presenting with pain located cervical spine. Diagnoses include:    ICD-10-CM ICD-9-CM   1. Cervical spondylosis  M47.812 721.0   2. Neck pain  M54.2 723.1   3. Cervical muscle pain  M54.2 723.1   4. Arthritis  M19.90 716.90      2. Pain Generators / Etiology: Cervical Radiculopathy and Cervical Spondylosis  3. Failed " Meds (E- Effective, NE- Not Effective):  Hydrocodone-effective  4. Physical Therapy - None  5. Psychological comorbidities - None  6. Anticoagulants / Antiplatelets: None       PLAN:  1. Interventional: None for now. can consider cervical medial branch blocks in the future.    2. Pharmacologic:   - Refill Norco 10/325 BID PRN (60 tabs) x 1 months. Paper Rx given post dated to 10/23.  Patient tolerating opioids with no side effects, obtaining good pain control with functional improvement.  We have discussed the risks of chronic opioid medication management.   - Will Refill Lyrica 75mg BID.   - Opioid contract signed on 7/27/2020.     - [place]  reviewed and appropriate. Last filled on 9/23/20.  - Will order UDS today to ensure medication compliance.     3. Rehabilitative: Encouraged regular exercise. Continue exercises and activities as tolerated.   She reports that she completed 1 week of at home physical therapy, and she was released because she said that was the most they could provide for her.  I am doubtful that she continues exercises at her home on her own.  Consider formal physical therapy in the future.    4. Diagnostic: None for now.    5. Follow up: 4 weeks for med refill with Dr. Hahn - to discuss him leaving the practice (PMLIST)    - This condition does not require this patient to take time off of work, and the primary goal of our Pain Management services is to improve the patient's functional capacity.   - I discussed the risks, benefits, and alternatives to potential treatment options. All questions and concerns were fully addressed today in clinic.           >>UDS:   07/27/2020 ::  Negative ( prior to starting medications)  10/19/2020 :: pending     Disclaimer:  This note was prepared using voice recognition system and is likely to have sound alike errors that may have been overlooked even after proof reading.  Please call me with any questions.

## 2020-10-27 ENCOUNTER — TELEPHONE (OUTPATIENT)
Dept: ADMINISTRATIVE | Facility: HOSPITAL | Age: 65
End: 2020-10-27

## 2020-10-27 NOTE — TELEPHONE ENCOUNTER
Contacted patient to schedule overdue mammogram. Unable to leave voicemail due to patient voicemail being full.

## 2020-10-30 ENCOUNTER — PATIENT MESSAGE (OUTPATIENT)
Dept: ADMINISTRATIVE | Facility: HOSPITAL | Age: 65
End: 2020-10-30

## 2020-11-06 ENCOUNTER — TELEPHONE (OUTPATIENT)
Dept: INTERNAL MEDICINE | Facility: CLINIC | Age: 65
End: 2020-11-06

## 2020-11-06 NOTE — TELEPHONE ENCOUNTER
----- Message from Yoselyn Carreon sent at 11/6/2020  9:38 AM CST -----  Contact: [phone]/SELF  Type:  Needs Medical Advice    Who Called: Patient  Symptoms (please be specific): HOME HEALTH   How long has patient had these symptoms:    Pharmacy name and phone #:    Would the patient rather a call back or a response via [facility]? Call Back  Best Call Back Number: [phone]  Additional Information: Patient would like a call back regarding Home Health        Lew/GARY

## 2020-11-09 ENCOUNTER — TELEPHONE (OUTPATIENT)
Dept: INTERNAL MEDICINE | Facility: CLINIC | Age: 65
End: 2020-11-09

## 2020-11-09 NOTE — TELEPHONE ENCOUNTER
Spoke with patient informed her that I would resend home health referral [facility] as requested.

## 2020-11-09 NOTE — TELEPHONE ENCOUNTER
----- Message from Yoselyn Carreon sent at 11/9/2020  9:24 AM CST -----  Contact: [phone]/SELF  Type:  Needs Medical Advice    Who Called: Patient  Symptoms (please be specific): HOME HEALTH APPROVAL   How long has patient had these symptoms:    Pharmacy name and phone #:    Would the patient rather a call back or a response via [facility]? Call Back  Best Call Back Number: [phone]  Additional Information: Patient would like to speak with nurse regarding Home Health Approval. Patient has 2 agencies that she would like to discuss with nurse that will be approved by her Insurance Co.  The Home health agencies are :[facility] [phone] and [facility] [phone]      Lew/GARY

## 2020-11-10 ENCOUNTER — PATIENT OUTREACH (OUTPATIENT)
Dept: ADMINISTRATIVE | Facility: OTHER | Age: 65
End: 2020-11-10

## 2020-11-10 DIAGNOSIS — Z12.11 ENCOUNTER FOR FIT (FECAL IMMUNOCHEMICAL TEST) SCREENING: Primary | ICD-10-CM

## 2020-11-13 ENCOUNTER — TELEPHONE (OUTPATIENT)
Dept: INTERNAL MEDICINE | Facility: CLINIC | Age: 65
End: 2020-11-13

## 2020-11-13 ENCOUNTER — TELEPHONE (OUTPATIENT)
Dept: PAIN MEDICINE | Facility: CLINIC | Age: 65
End: 2020-11-13

## 2020-11-13 NOTE — TELEPHONE ENCOUNTER
----- Message from Boom Rachel sent at 11/13/2020  7:42 AM CST -----  Contact: self  Type:  Mammogram    Caller is requesting to schedule their annual mammogram appointment.  Order is not listed in EPIC.  Please enter order and contact patient to schedule.  Name of Caller:Marley Malagon  Where would they like the mammogram performed?[facility]  Would the patient rather a call back or a response via [facility]? Call back  Best Call Back Number:[phone]  Additional Information: none

## 2020-11-13 NOTE — TELEPHONE ENCOUNTER
----- Message from Kenyetta Montano sent at 11/13/2020  7:56 AM CST -----  Regarding: Question  Contact: [phone]  Would like to consult with the nurse,  patient has a question  , please call back thanks sj

## 2020-11-17 ENCOUNTER — OFFICE VISIT (OUTPATIENT)
Dept: PAIN MEDICINE | Facility: CLINIC | Age: 65
End: 2020-11-17
Payer: MEDICARE

## 2020-11-17 ENCOUNTER — HOSPITAL ENCOUNTER (OUTPATIENT)
Dept: RADIOLOGY | Facility: HOSPITAL | Age: 65
Discharge: HOME | End: 2020-11-17
Attending: PHYSICIAN ASSISTANT
Payer: MEDICARE

## 2020-11-17 VITALS
HEART RATE: 105 BPM | SYSTOLIC BLOOD PRESSURE: 156 MMHG | DIASTOLIC BLOOD PRESSURE: 82 MMHG | BODY MASS INDEX: 40.66 KG/M2 | HEIGHT: 67 IN | WEIGHT: 259.06 LBS

## 2020-11-17 DIAGNOSIS — M47.812 CERVICAL SPONDYLOSIS: ICD-10-CM

## 2020-11-17 DIAGNOSIS — G89.29 OTHER CHRONIC PAIN: ICD-10-CM

## 2020-11-17 PROCEDURE — 3079F DIAST BP 80-89 MM HG: CPT | Mod: CPTII,S$GLB,, | Performed by: PAIN MEDICINE

## 2020-11-17 PROCEDURE — 99999 PR PBB SHADOW E&M-EST. PATIENT-LVL III: CPT | Mod: PBBFAC,,, | Performed by: PAIN MEDICINE

## 2020-11-17 PROCEDURE — 3077F SYST BP >= 140 MM HG: CPT | Mod: CPTII,S$GLB,, | Performed by: PAIN MEDICINE

## 2020-11-17 PROCEDURE — 1125F PR PAIN SEVERITY QUANTIFIED, PAIN PRESENT: ICD-10-PCS | Mod: S$GLB,,, | Performed by: PAIN MEDICINE

## 2020-11-17 PROCEDURE — 3288F FALL RISK ASSESSMENT DOCD: CPT | Mod: CPTII,S$GLB,, | Performed by: PAIN MEDICINE

## 2020-11-17 PROCEDURE — 3288F PR FALLS RISK ASSESSMENT DOCUMENTED: ICD-10-PCS | Mod: CPTII,S$GLB,, | Performed by: PAIN MEDICINE

## 2020-11-17 PROCEDURE — 99999 PR PBB SHADOW E&M-EST. PATIENT-LVL III: ICD-10-PCS | Mod: PBBFAC,,, | Performed by: PAIN MEDICINE

## 2020-11-17 PROCEDURE — 3077F PR MOST RECENT SYSTOLIC BLOOD PRESSURE >= 140 MM HG: ICD-10-PCS | Mod: CPTII,S$GLB,, | Performed by: PAIN MEDICINE

## 2020-11-17 PROCEDURE — 1125F AMNT PAIN NOTED PAIN PRSNT: CPT | Mod: S$GLB,,, | Performed by: PAIN MEDICINE

## 2020-11-17 PROCEDURE — 99214 PR OFFICE/OUTPT VISIT, EST, LEVL IV, 30-39 MIN: ICD-10-PCS | Mod: S$GLB,,, | Performed by: PAIN MEDICINE

## 2020-11-17 PROCEDURE — 99214 OFFICE O/P EST MOD 30 MIN: CPT | Mod: S$GLB,,, | Performed by: PAIN MEDICINE

## 2020-11-17 PROCEDURE — 72052 X-RAY EXAM NECK SPINE 6/>VWS: CPT | Mod: TC

## 2020-11-17 PROCEDURE — 72052 XR CERVICAL SPINE 5 VIEW WITH FLEX AND EXT: ICD-10-PCS | Mod: 26,,, | Performed by: RADIOLOGY

## 2020-11-17 PROCEDURE — 1101F PR PT FALLS ASSESS DOC 0-1 FALLS W/OUT INJ PAST YR: ICD-10-PCS | Mod: CPTII,S$GLB,, | Performed by: PAIN MEDICINE

## 2020-11-17 PROCEDURE — 3008F BODY MASS INDEX DOCD: CPT | Mod: CPTII,S$GLB,, | Performed by: PAIN MEDICINE

## 2020-11-17 PROCEDURE — 72052 X-RAY EXAM NECK SPINE 6/>VWS: CPT | Mod: 26,,, | Performed by: RADIOLOGY

## 2020-11-17 PROCEDURE — 3008F PR BODY MASS INDEX (BMI) DOCUMENTED: ICD-10-PCS | Mod: CPTII,S$GLB,, | Performed by: PAIN MEDICINE

## 2020-11-17 PROCEDURE — 1101F PT FALLS ASSESS-DOCD LE1/YR: CPT | Mod: CPTII,S$GLB,, | Performed by: PAIN MEDICINE

## 2020-11-17 PROCEDURE — 3079F PR MOST RECENT DIASTOLIC BLOOD PRESSURE 80-89 MM HG: ICD-10-PCS | Mod: CPTII,S$GLB,, | Performed by: PAIN MEDICINE

## 2020-11-17 RX ORDER — HYDROCODONE BITARTRATE AND ACETAMINOPHEN 10; 325 MG/1; MG/1
1 TABLET ORAL EVERY 8 HOURS PRN
Qty: 90 TABLET | Refills: 0 | Status: SHIPPED | OUTPATIENT
Start: 2020-11-17 | End: 2020-12-17

## 2020-11-17 RX ORDER — PREGABALIN 75 MG/1
75 CAPSULE ORAL 2 TIMES DAILY
Qty: 60 CAPSULE | Refills: 0 | Status: SHIPPED | OUTPATIENT
Start: 2020-11-17

## 2020-11-17 NOTE — PROGRESS NOTES
Chief Pain Complaint:  Follow-up and Neck Pain    Interval HPI: (11/17/2020):  Ms. Malagon returns to clinic for follow-up.  Today she reports her pain as a 9/10 which is located primarily in the cervical spine.  She reports that she had a fall last week which is exacerbated her pain.  Most her pain is located in the upper cervical spine however she does report having numbness in her bilateral arms and hands.  She specifically has cramping in her hands bilaterally which she finds the Lyrica and hydrocodone her helpful however she believes that increasing hydrocodone to 3 times daily would be more effective for her.  She denies having had any adverse effects with the medications and finds that it does allow her and increased functional capacity on a daily basis.    Interval History (10/19/2020): Patient was last seen on 8/24/2020. At that visit, the plan was to continue medication.  No changes since last visit.  Patient reports pain as 9/10 today.    Interval History (8/24/2020): Patient was last seen on 7/27/2020. At that visit, the plan was to start Norco 10/325 BID PRN, which she had previously been taking from Dr. Dixon. She wants to continue coming to our clinic since it is closer to her home.   Patient reports pain as 9/10 today.    Initial HPI (7/27/2020):  This patient is a 65 y.o. female who presents today complaining of the above noted pain/s. The patient describes the pain as follows.  Ms. Malagon is new patient clinic with complaints of neck and low back pain.  She reports having had these symptoms for approximately 2 years with no known inciting event.  Today she rates her neck pain as a 10/10 in her low back pain is rated as a 9/10.  She describes aching, throbbing, stabbing sensation which is worse with movement and standing improved with medications and rest.  She has tried numerous medications including ibuprofen, Lyrica, Lortab, Flexeril, Mobic.  She has also used a heating pad which has not been  helpful.  She reports having arthritis from the neck all the way down to her low back and has previously seen an outside pain doctor however she reports that she was having to get someone to drive for in is 2 4 to drive and too expensive for her to continue going to that clinic.  They had maintained her with muscle relaxers and Lortab which she takes Lortab approximately 1-2 tablets per day of the 10/325 mg prescriptions.  She has had a lumbar epidural steroid injections several years ago which she did find to be helpful.  She has a referral for physical therapy which she will be starting in the next several days.  She endorses having numbness in her hands bilaterally almost of her pain is located in the cervical spine.  She is accompanied with a friend today to has endorse having had lumbar radiofrequency ablation in the past and how helpful this has been for her.    Previous Therapy:  Medications:  Motrin, Lyrica, hydrocodone, Flexeril  Injections: Lumbar ANDREA in the past  Surgeries: No spinal surgery   Physical Therapy: None         Past Surgical History:   Procedure Laterality Date    cardiac stent      CHOLECYSTECTOMY      HYSTERECTOMY      fibroids    OOPHORECTOMY           Imaging / Labs / Studies (reviewed on 11/17/2020):    8/24/20 X-Ray Chest PA And Lateral  TECHNIQUE:  PA and lateral views of the chest were performed.  COMPARISON:  January 16, 2017, July 21, 2014  FINDINGS:  Cardiomegaly with findings suggestive of possible pulmonary venous congestion versus early failure superimposed on chronic underlying interstitial lung disease.  Few scattered areas of a focal fibrosis suggested without dense consolidation or effusion.  No pneumothorax.  Stable mild prominence and indistinctness central pulmonary vasculature.  Trachea normal in position allowing for slight rotation and underlying scoliosis.  Degenerative change throughout the spine with multilevel bridging syndesmophytes..  Generalized osteopenia.   Status post cholecystectomy.  Impression: Cardiomegaly with findings suggestive of a possible early pulmonary venous congestion versus failure superimposed on underlying chronic interstitial lung disease.  No discrete area of the focal consolidation or effusion.  See above.  Follow-up as warranted based on clinical and laboratory findings.    Review of Systems:  Review of Systems   Constitutional: Negative for fever.   Eyes: Negative for blurred vision.   Respiratory: Negative for cough and wheezing.    Cardiovascular: Negative for chest pain and orthopnea.   Gastrointestinal: Negative for constipation, diarrhea, nausea and vomiting.   Genitourinary: Negative for dysuria.   Musculoskeletal: Positive for neck pain.   Skin: Negative for itching and rash.   Endo/Heme/Allergies: Does not bruise/bleed easily.     Physical Exam:  Vitals:  There were no vitals filed for this visit. (reviewed on 11/17/2020)    General: alert and oriented, in no apparent distress.  Gait: In wheelchair.  Skin: no rashes, no discoloration, no obvious lesions  HEENT: normocephalic, atraumatic. Pupils equal and round.  Cardiovascular: no significant peripheral edema present.  Respiratory: O2 nasal cannula in place. Labored breathing     Musculoskeletal - Cervical Spine:   - Limited ROM secondary to pain reproduction   - Pain on flexion of cervical spine: Present   - Pain on extension of cervical spine: Present   - Cervical facet loading: Present   - TTP over the cervical facet joints: Present   - TTP over the cervical paraspinals: Present   - Spurling's: Negative    Neuro - Upper Extremities:  - BUE Strength:R/L: D: 5/5; B: 5/5; T: 5/5; WF: 5/5; WE: 5/5; IO: 5/5  - Extremity Reflexes: Brisk and symmetric throughout  - Sensory: Sensation to light touch intact bilaterally      Psych:  Mood and affect is appropriate    Assessment  1. 65 y.o. year old patient with PMH of   Past Medical History:   Diagnosis Date    Arthritis     COPD (chronic  obstructive pulmonary disease)     Dental caries 4/21/2016    Diabetes mellitus, type 2     Heart attack 2005    Hypertension     ELAINA (obstructive sleep apnea)     Pain in oral cavity 7/26/2017    Pneumonia due to infectious organism 7/17/2020      presenting with pain located cervical spine. Diagnoses include:  Cervical spondylosis  2. Pain Generators / Etiology: Cervical Radiculopathy and Cervical Spondylosis  3. Failed Meds (E- Effective, NE- Not Effective):  Hydrocodone-effective, Lyrica-effective  4. Physical Therapy - None  5. Psychological comorbidities - None  6. Anticoagulants / Antiplatelets: None     PLAN:  1. Interventional: None for now. can consider cervical medial branch blocks in the future.    2. Pharmacologic:   - will increase Norco 10/325 2 TID PRN (90 tabs) x 1 months. Paper Rx given.  Patient tolerating opioids with no side effects, obtaining good pain control with functional improvement.  We have discussed the risks of chronic opioid medication management.   - Will Refill Lyrica 75mg BID.   - Opioid contract signed on 7/27/2020.     - [place]  reviewed and appropriate.   - UDS at last visit showed positive for Dilaudid which was not prescribed for the patient in addition to hydrocodone  -this will be the last prescription given for this patient as she will be provided with a list of other clinics      3. Rehabilitative: Encouraged regular exercise. Continue exercises and activities as tolerated.   She reports that she completed 1 week of at home physical therapy, and she was released because she said that was the most they could provide for her.  I am doubtful that she continues exercises at her home on her own.  Consider formal physical therapy in the future.    4. Diagnostic: None for now.    5. Follow up:  None    - This condition does not require this patient to take time off of work, and the primary goal of our Pain Management services is to improve the patient's functional capacity.    - I discussed the risks, benefits, and alternatives to potential treatment options. All questions and concerns were fully addressed today in clinic.       SHADY Hahn MD  Interventional Pain Medicine  [facility]      >>UDS:   07/27/2020 ::  Negative ( prior to starting medications)  10/19/2020 ::  Positive for Dilaudid and hydrocodone    Disclaimer:  This note was prepared using voice recognition system and is likely to have sound alike errors that may have been overlooked even after proof reading.  Please call me with any questions.      I have notified the patient today that I will be moving from the region and leaving the [facility] system towards the end of December.  The patient has been given the option to continue receiving treatment in the [facility] system with Interventional Pain Management or has been provided with a list of other Interventional Pain providers in the area.  Prescriptions will be given prior to my last day, so that the patient will not have any gaps in their prescriptions.

## 2020-11-17 NOTE — PATIENT INSTRUCTIONS
-will provide refill Lyrica 75 mg tablets twice daily  -will provide refill on increase hydrocodone to 10/325 mg tablets 3 times daily only as needed  -have provided patient with list of other clinics  -a patient follow up in clinic only as needed

## 2020-11-23 ENCOUNTER — TELEPHONE (OUTPATIENT)
Dept: INTERNAL MEDICINE | Facility: CLINIC | Age: 65
End: 2020-11-23

## 2020-11-23 NOTE — TELEPHONE ENCOUNTER
Spoke with patient, states that her current pain management doctor is leaving and going back to [place]. Pt states she will contact office back with name of another provider. States that she did contact [facility] but doesn't have the provider name.

## 2020-11-23 NOTE — TELEPHONE ENCOUNTER
----- Message from Lindsay Munoz sent at 11/23/2020  3:03 PM CST -----  Regarding: approve her pain management  Contact: pt  Caller is requesting a call back regarding approving  her pain management   Please call back at [phone].  Thanks.

## 2020-11-24 ENCOUNTER — TELEPHONE (OUTPATIENT)
Dept: INTERNAL MEDICINE | Facility: CLINIC | Age: 65
End: 2020-11-24

## 2020-11-24 ENCOUNTER — HOSPITAL ENCOUNTER (OUTPATIENT)
Dept: RADIOLOGY | Facility: HOSPITAL | Age: 65
Discharge: HOME | End: 2020-11-24
Attending: PODIATRIST
Payer: MEDICARE

## 2020-11-24 ENCOUNTER — OFFICE VISIT (OUTPATIENT)
Dept: PODIATRY | Facility: CLINIC | Age: 65
End: 2020-11-24
Payer: MEDICARE

## 2020-11-24 VITALS
WEIGHT: 259.06 LBS | DIASTOLIC BLOOD PRESSURE: 66 MMHG | HEART RATE: 84 BPM | SYSTOLIC BLOOD PRESSURE: 136 MMHG | BODY MASS INDEX: 40.57 KG/M2

## 2020-11-24 DIAGNOSIS — M79.675 TOE PAIN, CHRONIC, LEFT: ICD-10-CM

## 2020-11-24 DIAGNOSIS — G89.29 TOE PAIN, CHRONIC, LEFT: ICD-10-CM

## 2020-11-24 DIAGNOSIS — Z79.4 TYPE 2 DIABETES MELLITUS WITH COMPLICATION, WITH LONG-TERM CURRENT USE OF INSULIN: Primary | ICD-10-CM

## 2020-11-24 DIAGNOSIS — E11.8 TYPE 2 DIABETES MELLITUS WITH COMPLICATION, WITH LONG-TERM CURRENT USE OF INSULIN: Primary | ICD-10-CM

## 2020-11-24 PROCEDURE — 3288F PR FALLS RISK ASSESSMENT DOCUMENTED: ICD-10-PCS | Mod: CPTII,S$GLB,, | Performed by: PODIATRIST

## 2020-11-24 PROCEDURE — 73630 XR FOOT COMPLETE 3 VIEW LEFT: ICD-10-PCS | Mod: 26,LT,, | Performed by: RADIOLOGY

## 2020-11-24 PROCEDURE — 99204 OFFICE O/P NEW MOD 45 MIN: CPT | Mod: S$GLB,,, | Performed by: PODIATRIST

## 2020-11-24 PROCEDURE — 3046F PR MOST RECENT HEMOGLOBIN A1C LEVEL > 9.0%: ICD-10-PCS | Mod: CPTII,S$GLB,, | Performed by: PODIATRIST

## 2020-11-24 PROCEDURE — 3008F BODY MASS INDEX DOCD: CPT | Mod: CPTII,S$GLB,, | Performed by: PODIATRIST

## 2020-11-24 PROCEDURE — 73630 X-RAY EXAM OF FOOT: CPT | Mod: TC,LT

## 2020-11-24 PROCEDURE — 3078F PR MOST RECENT DIASTOLIC BLOOD PRESSURE < 80 MM HG: ICD-10-PCS | Mod: CPTII,S$GLB,, | Performed by: PODIATRIST

## 2020-11-24 PROCEDURE — 1101F PR PT FALLS ASSESS DOC 0-1 FALLS W/OUT INJ PAST YR: ICD-10-PCS | Mod: CPTII,S$GLB,, | Performed by: PODIATRIST

## 2020-11-24 PROCEDURE — 3008F PR BODY MASS INDEX (BMI) DOCUMENTED: ICD-10-PCS | Mod: CPTII,S$GLB,, | Performed by: PODIATRIST

## 2020-11-24 PROCEDURE — 3075F SYST BP GE 130 - 139MM HG: CPT | Mod: CPTII,S$GLB,, | Performed by: PODIATRIST

## 2020-11-24 PROCEDURE — 3075F PR MOST RECENT SYSTOLIC BLOOD PRESS GE 130-139MM HG: ICD-10-PCS | Mod: CPTII,S$GLB,, | Performed by: PODIATRIST

## 2020-11-24 PROCEDURE — 1101F PT FALLS ASSESS-DOCD LE1/YR: CPT | Mod: CPTII,S$GLB,, | Performed by: PODIATRIST

## 2020-11-24 PROCEDURE — 3288F FALL RISK ASSESSMENT DOCD: CPT | Mod: CPTII,S$GLB,, | Performed by: PODIATRIST

## 2020-11-24 PROCEDURE — 3078F DIAST BP <80 MM HG: CPT | Mod: CPTII,S$GLB,, | Performed by: PODIATRIST

## 2020-11-24 PROCEDURE — 73630 X-RAY EXAM OF FOOT: CPT | Mod: 26,LT,, | Performed by: RADIOLOGY

## 2020-11-24 PROCEDURE — 99999 PR PBB SHADOW E&M-EST. PATIENT-LVL V: CPT | Mod: PBBFAC,,, | Performed by: PODIATRIST

## 2020-11-24 PROCEDURE — 3046F HEMOGLOBIN A1C LEVEL >9.0%: CPT | Mod: CPTII,S$GLB,, | Performed by: PODIATRIST

## 2020-11-24 PROCEDURE — 99999 PR PBB SHADOW E&M-EST. PATIENT-LVL V: ICD-10-PCS | Mod: PBBFAC,,, | Performed by: PODIATRIST

## 2020-11-24 PROCEDURE — 99204 PR OFFICE/OUTPT VISIT, NEW, LEVL IV, 45-59 MIN: ICD-10-PCS | Mod: S$GLB,,, | Performed by: PODIATRIST

## 2020-11-24 NOTE — TELEPHONE ENCOUNTER
----- Message from Rupali Carrillo sent at 11/24/2020 11:26 AM CST -----  Regarding: pt  Pt would like to inform  of her old pain mgt provider - Dr.Alpesh Suarez. Any questions or concerns please call back at .[phone] (home)       Thank You ,   Rupali Carrillo

## 2020-11-24 NOTE — PROGRESS NOTES
Subjective:     Patient ID: Marley Malagon is a 65 y.o. female.    Chief Complaint: Foot Pain (Left foot pain in pinky toe pain level 9/10, diabetic PT wears casual shoes with socks, last seen on 10/12/20PCP Dr. Andrews)    Marley is a 65 y.o. female who presents to the clinic for evaluation and treatment of high risk feet. Marley has a past medical history of Arthritis, COPD (chronic obstructive pulmonary disease), Dental caries (4/21/2016), Diabetes mellitus, type 2, Heart attack (2005), Hypertension, ELAINA (obstructive sleep apnea), Pain in oral cavity (7/26/2017), and Pneumonia due to infectious organism (7/17/2020). The patient's chief complaint is left 5th toe pain. Patient rates pain 9/10. Patient states she broke the toe about 1-2 years ago but over the past month she has increased pain in the toe. Patient states the pain is worse if she is on the foot for a long period of time.  This patient has documented high risk feet requiring routine maintenance secondary to diabetes mellitis and those secondary complications of diabetes, as mentioned..    PCP: Kulwinder Andrews MD    Date Last Seen by PCP: 10/12/2020    Current shoe gear:  Affected Foot: Casual shoes     Unaffected Foot: Casual shoes    Hemoglobin A1C   Date Value Ref Range Status   10/12/2020 9.4 (H) 4.0 - 5.6 % Final     Comment:     ADA Screening Guidelines:  5.7-6.4%  Consistent with prediabetes  >or=6.5%  Consistent with diabetes  High levels of fetal hemoglobin interfere with the HbA1C  assay. Heterozygous hemoglobin variants (HbS, HgC, etc)do  not significantly interfere with this assay.   However, presence of multiple variants may affect accuracy.     07/02/2020 11.1 (H) 4.0 - 5.6 % Final     Comment:     ADA Screening Guidelines:  5.7-6.4%  Consistent with prediabetes  >or=6.5%  Consistent with diabetes  High levels of fetal hemoglobin interfere with the HbA1C  assay. Heterozygous hemoglobin variants (HbS, HgC, etc)do  not significantly  interfere with this assay.   However, presence of multiple variants may affect accuracy.     03/08/2020 11.1 (H) <=6.5 % Final   01/23/2018 11.3 (H) 4.5 - 6.2 % Final     Comment:     According to ADA guidelines, hemoglobin A1C <7.0% represents  optimal control in non-pregnant diabetic patients.  Different  metrics may apply to specific populations.   Standards of Medical Care in Diabetes - 2016.  For the purpose of screening for the presence of diabetes:  <5.7%     Consistent with the absence of diabetes  5.7-6.4%  Consistent with increasing risk for diabetes   (prediabetes)  >or=6.5%  Consistent with diabetes  Currently no consensus exists for use of hemoglobin A1C  for diagnosis of diabetes for children.         Patient Active Problem List   Diagnosis    Chronic pain    History of MI (myocardial infarction)    Essential hypertension    Type 2 diabetes mellitus with complication, with long-term current use of insulin    Hypokalemia    CKD (chronic kidney disease) stage 3, GFR 30-59 ml/min    Hyperlipidemia    Arthritis    Dyspnea    Encounter for screening colonoscopy    Screening mammogram, encounter for    Chronic combined systolic and diastolic congestive heart failure    Coronary artery disease involving native coronary artery of native heart    Stented coronary artery    Chronic obstructive pulmonary disease    Congestive heart failure       Medication List with Changes/Refills   Current Medications    ALBUTEROL (PROVENTIL/VENTOLIN HFA) 90 MCG/ACTUATION INHALER    Inhale 2 puffs into the lungs.    ASPIRIN (ECOTRIN) 81 MG EC TABLET    Take 81 mg by mouth once daily.    ATORVASTATIN (LIPITOR) 40 MG TABLET    Take 1 tablet (40 mg total) by mouth once daily.    CHOLECALCIFEROL, VITAMIN D3, (VITAMIN D3) 50 MCG (2,000 UNIT) CAP    Take 1 tablet by mouth.    CYCLOBENZAPRINE (FLEXERIL) 10 MG TABLET    Take 1 tablet (10 mg total) by mouth daily as needed.    FLUTICASONE (FLONASE) 50 MCG/ACTUATION NASAL  "SPRAY    1 spray by Each Nare route once daily.    FLUTICASONE FUROATE-VILANTEROL (BREO) 100-25 MCG/DOSE DISKUS INHALER    Inhale 1 puff into the lungs once daily.    FUROSEMIDE (LASIX) 40 MG TABLET    TAKE 1 TABLET BY MOUTH ONCE DAILY    HYDROCODONE-ACETAMINOPHEN (NORCO)  MG PER TABLET    Take 1 tablet by mouth every 8 (eight) hours as needed for Pain.    INSULIN GLARGINE 100 UNITS/ML (3ML) SUBQ PEN    Inject 50 Units into the skin.    INSULIN GLARGINE 100 UNITS/ML (3ML) SUBQ PEN    Inject 50 Units into the skin.    INSULIN LISPRO 100 UNIT/ML INJECTION    Inject 10 Units into the skin 3 (three) times daily.    INSULIN SYRINGE-NEEDLE U-100 1 ML 31 GAUGE X 5/16 SYRG    USE WITH insulin TWICE DAILY UNDER SKIN    ISOSORBIDE-HYDRALAZINE 20-37.5 MG (BIDIL) 20-37.5 MG TAB    Take 1 tablet by mouth 3 (three) times daily.    METOPROLOL SUCCINATE (TOPROL-XL) 100 MG 24 HR TABLET    Take 1 tablet (100 mg total) by mouth once daily.    MULTIVITAMIN (ONE DAILY MULTIVITAMIN) PER TABLET    Take 1 tablet by mouth once daily.    MUPIROCIN (BACTROBAN) 2 % OINTMENT    Apply topically daily as needed.    NITROGLYCERIN (NITROSTAT) 0.4 MG SL TABLET    Place 1 tablet (0.4 mg total) under the tongue every 5 (five) minutes as needed for Chest pain.    NYSTATIN (MYCOSTATIN) CREAM    Apply topically 2 (two) times daily.    OMEPRAZOLE (PRILOSEC) 40 MG CAPSULE    Take 1 capsule (40 mg total) by mouth every morning.    PEN NEEDLE, DIABETIC 31 GAUGE X 5/16" NDLE    One injection daily    POTASSIUM CHLORIDE SA (K-DUR,KLOR-CON) 20 MEQ TABLET    TAKE 1 TABLET BY MOUTH ONCE DAILY    PREGABALIN (LYRICA) 75 MG CAPSULE    Take 1 capsule (75 mg total) by mouth 2 (two) times daily.    PROMETHAZINE (PHENERGAN) 25 MG TABLET    Take 25 mg by mouth every 6 (six) hours as needed.    SEMAGLUTIDE (OZEMPIC) 0.25 MG OR 0.5 MG(2 MG/1.5 ML) PNIJ    Inject 0.5 mg into the skin every 7 days.    TRIAMCINOLONE ACETONIDE 0.1% (KENALOG) 0.1 % CREAM    Apply " topically 2 (two) times daily.    TRUE METRIX GLUCOSE TEST STRIP STRP    USE TO TEST TWICE DAILY    TRUEPLUS LANCETS 28 GAUGE MISC    USE TO TEST TWICE DAILY    VITAMIN D (VITAMIN D3) 1000 UNITS TAB    Take 2,000 Units by mouth once daily.       Review of patient's allergies indicates:   Allergen Reactions    Ace inhibitors Swelling       Past Surgical History:   Procedure Laterality Date    cardiac stent      CHOLECYSTECTOMY      HYSTERECTOMY      fibroids    OOPHORECTOMY         Family History   Problem Relation Age of Onset    Diabetes Mother     Kidney disease Mother     Alcohol abuse Father     Breast cancer Maternal Aunt        Social History     Socioeconomic History    Marital status:      Spouse name: Not on file    Number of children: Not on file    Years of education: 11    Highest education level: Not on file   Occupational History    Not on file   Social Needs    Financial resource strain: Not on file    Food insecurity     Worry: Not on file     Inability: Not on file    Transportation needs     Medical: Not on file     Non-medical: Not on file   Tobacco Use    Smoking status: Never Smoker    Smokeless tobacco: Never Used   Substance and Sexual Activity    Alcohol use: No     Alcohol/week: 0.0 standard drinks    Drug use: No    Sexual activity: Never     Birth control/protection: Surgical   Lifestyle    Physical activity     Days per week: Not on file     Minutes per session: Not on file    Stress: Not on file   Relationships    Social connections     Talks on phone: Not on file     Gets together: Not on file     Attends [employer] service: Not on file     Active member of club or organization: Not on file     Attends meetings of clubs or organizations: Not on file     Relationship status: Not on file   Other Topics Concern    Are you pregnant or think you may be? Not Asked    Breast-feeding Not Asked   Social History Narrative    Not on file       Vitals:    11/24/20  0919   BP: 136/66   Pulse: 84   Weight: 117.5 kg (259 lb 0.7 oz)       Hemoglobin A1C   Date Value Ref Range Status   10/12/2020 9.4 (H) 4.0 - 5.6 % Final     Comment:     ADA Screening Guidelines:  5.7-6.4%  Consistent with prediabetes  >or=6.5%  Consistent with diabetes  High levels of fetal hemoglobin interfere with the HbA1C  assay. Heterozygous hemoglobin variants (HbS, HgC, etc)do  not significantly interfere with this assay.   However, presence of multiple variants may affect accuracy.     07/02/2020 11.1 (H) 4.0 - 5.6 % Final     Comment:     ADA Screening Guidelines:  5.7-6.4%  Consistent with prediabetes  >or=6.5%  Consistent with diabetes  High levels of fetal hemoglobin interfere with the HbA1C  assay. Heterozygous hemoglobin variants (HbS, HgC, etc)do  not significantly interfere with this assay.   However, presence of multiple variants may affect accuracy.     03/08/2020 11.1 (H) <=6.5 % Final   01/23/2018 11.3 (H) 4.5 - 6.2 % Final     Comment:     According to ADA guidelines, hemoglobin A1C <7.0% represents  optimal control in non-pregnant diabetic patients.  Different  metrics may apply to specific populations.   Standards of Medical Care in Diabetes - 2016.  For the purpose of screening for the presence of diabetes:  <5.7%     Consistent with the absence of diabetes  5.7-6.4%  Consistent with increasing risk for diabetes   (prediabetes)  >or=6.5%  Consistent with diabetes  Currently no consensus exists for use of hemoglobin A1C  for diagnosis of diabetes for children.         Review of Systems   Constitutional: Negative for chills and fever.   Respiratory: Negative for shortness of breath.    Cardiovascular: Negative for chest pain, palpitations, orthopnea, claudication and leg swelling.   Gastrointestinal: Negative for diarrhea, nausea and vomiting.   Musculoskeletal: Negative for joint pain.   Skin: Negative for rash.   Neurological: Positive for tingling. Negative for dizziness, sensory change,  focal weakness and weakness.   Psychiatric/Behavioral: Negative.          Objective:      PHYSICAL EXAM: Apperance: Alert and orient in no distress,well developed, and with good attention to grooming and body habits  Patient presents ambulating in casual shoes.   LOWER EXTREMITY EXAM:  VASCULAR: Dorsalis pedis pulses 2/4 bilateral and Posterior Tibial pulses 2/4 bilateral. Capillary fill time <4 seconds bilateral. No edema observed bilateral. Varicosities absent bilateral. Skin temperature of the lower extremities is warm to warm, proximal to distal. Hair growth absent bilateral.  DERMATOLOGICAL: No skin rashes, subcutaneous nodules, lesions, or ulcers observed bilateral. Nails 1,2,3,4,5 bilateral normal length and thickness. Webspaces 1,2,3,4 bilateral clean, dry and without evidence of break in skin integrity. Mild hyperkeratotic tissue noted to bilateral plantar lateral feet.   NEUROLOGICAL: Light touch, sharp-dull, proprioception all present and equal bilaterally.  Vibratory sensation intact at bilateral hallux. Protective sensation intact at all 10 sites as tested with a [employer] 5.07 monofilament.   MUSCULOSKELETAL: Muscle strength is 5/5 for foot inverters, everters, plantarflexors, and dorsiflexors. Muscle tone is normal. No pain on palpation or ROM of left 5th toe.        Assessment:       Encounter Diagnoses   Name Primary?    Type 2 diabetes mellitus with complication, with long-term current use of insulin Yes    Toe pain, chronic, left - Left Foot          Plan:   Type 2 diabetes mellitus with complication, with long-term current use of insulin    Toe pain, chronic, left - Left Foot  -     X-Ray Foot Complete Left; Future; Expected date: 11/24/2020      I counseled the patient on her conditions, regarding findings of my examination, my impressions, and usual treatment plan.   Greater than 50% of this visit spent on counseling and coordination of care.  Greater than 15 minutes of a 20 minute  appointment spent on education about the diabetic foot, neuropathy, and prevention of limb loss.  Shoe inspection. Diabetic Foot Education. Patient reminded of the importance of good nutrition and blood sugar control to help prevent podiatric complications of diabetes. Patient instructed on proper foot hygeine. We discussed wearing proper shoe gear, daily foot inspections, never walking without protective shoe gear, never putting sharp instruments to feet.    Ordered left foot x-rays to be completed today.   The patient and I reviewed the types of shoes she should be wearing, my recommendation includes generally the best time of the day for a shoe fitting is the afternoon, shoes with a wide toe box, very good cushion, and tennis shoes with removable inner soles.The patient and I reviewed my recommendations for over-the-counter orthotic inserts.   Patient  will continue to monitor the areas daily, inspect feet, wear protective shoe gear when ambulatory, moisturizer to maintain skin integrity. Patient reminded of the importance of good nutrition and blood sugar control to help prevent podiatric complications of diabetes.  Patient to return 6 months or sooner if needed.                 Ana Moss DPM  [facility]

## 2020-11-24 NOTE — TELEPHONE ENCOUNTER
See patient msg. Patient states that her old pain management doctor has moved out of town and she is needs a referral to see another pain management Dr.Alpesh Suarez

## 2020-11-25 ENCOUNTER — TELEPHONE (OUTPATIENT)
Dept: INTERNAL MEDICINE | Facility: CLINIC | Age: 65
End: 2020-11-25

## 2020-11-30 ENCOUNTER — TELEPHONE (OUTPATIENT)
Dept: PAIN MEDICINE | Facility: CLINIC | Age: 65
End: 2020-11-30

## 2020-11-30 NOTE — TELEPHONE ENCOUNTER
----- Message from Madeline Fox sent at 11/30/2020 12:40 PM CST -----  Contact: Pt  .Type:  Sooner Appointment Request    Caller is requesting a sooner appointment.  Caller declined first available appointment listed below.  Caller will not accept being placed on the waitlist and is requesting a message be sent to doctor.  Name of Caller: MarleyMargueritepriyank is the first available appointment? N/a  Symptoms: pain all over  Would the patient rather a call back or a response via [facility]? Call back  Best Call Back Number: [phone] (home)     Additional Information:

## 2020-11-30 NOTE — TELEPHONE ENCOUNTER
"Informed pt per  last note that we will not be continuing any opioid medications . Informed pt I can set up with  , but he will not prescribe opioid medication. Pt said " no thanks will find someone who will prescribe my meds and hung up"  " UDS at last visit showed positive for Dilaudid which was not prescribed for the patient in addition to hydrocodone  -this will be the last prescription given for this patient as she will be provided with a list of other clinics"     "

## 2020-12-02 ENCOUNTER — TELEPHONE (OUTPATIENT)
Dept: PAIN MEDICINE | Facility: CLINIC | Age: 65
End: 2020-12-02

## 2020-12-02 ENCOUNTER — TELEPHONE (OUTPATIENT)
Dept: INTERNAL MEDICINE | Facility: CLINIC | Age: 65
End: 2020-12-02

## 2020-12-02 DIAGNOSIS — R21 RASH AND NONSPECIFIC SKIN ERUPTION: ICD-10-CM

## 2020-12-02 NOTE — TELEPHONE ENCOUNTER
----- Message from Mira Jasmine sent at 12/2/2020  2:27 PM CST -----  Type:  RX Refill Request    Who Called: Marley  Refill or New Rx:refill  RX Name and Strength:Mupirocin cream  How is the patient currently taking it? (ex. 1XDay):  Is this a 30 day or 90 day RX:  Preferred Pharmacy with phone number:      [facility] - [place] - [address], [place]  [address], [place] [address]  Phone: [phone] Fax: [phone]      Local or Mail Order:celeste  Ordering Provider:Juliana  Would the patient rather a call back or a response via [facility]? call  Best Call Back Number:[phone]    Additional Information:

## 2020-12-02 NOTE — TELEPHONE ENCOUNTER
Spoke with patient informed her that multiple fax were sent to [facility]. Informed patient that I would resend fax. Also spoke with patient regarding pain management. Pt told me that she never was contact for pain management appointment. Informed patient that she was contacted by  office per documentation and she declined appointment. Patient informed me that she would contact  office back to discuss treatment options and a possible release so that she can be referred to physician of her choice.

## 2020-12-02 NOTE — TELEPHONE ENCOUNTER
----- Message from Boom Rachel sent at 12/2/2020  7:40 AM CST -----  Contact: self  Requesting callback regarding questions on if provider has contacted home health and pain management for provider. Also pt wants to know the status of her prescription. Please call back at [phone].

## 2020-12-02 NOTE — TELEPHONE ENCOUNTER
----- Message from Rupali Carrillo sent at 12/2/2020  8:03 AM CST -----  Regarding: pt  Pt would like a call from nurse in regards to home health . Please call back at .[phone] (home)       Thank You ,   Rupali Carrillo

## 2020-12-02 NOTE — TELEPHONE ENCOUNTER
----- Message from Betty Gibson sent at 12/2/2020  8:23 AM CST -----  Pt would like return call regarding referral for home health .  Please call back at [phone]. Md Fracisco

## 2020-12-02 NOTE — TELEPHONE ENCOUNTER
----- Message from Mira Jasmine sent at 12/2/2020 11:22 AM CST -----  Pt is requesting a call back regarding provider approving home health . This is pt second message. Please call back at [phone]

## 2020-12-02 NOTE — TELEPHONE ENCOUNTER
----- Message from Amaya Wood sent at 12/2/2020 11:59 AM CST -----  Contact: Marley Church called in regarding speaking to the nurse about a referral for another pain management doctor. Please give her a call back at [phone].    Thanks,  Pb

## 2020-12-03 RX ORDER — TRIAMCINOLONE ACETONIDE 1 MG/G
CREAM TOPICAL 2 TIMES DAILY
Qty: 15 G | Refills: 0 | Status: SHIPPED | OUTPATIENT
Start: 2020-12-03 | End: 2020-12-13

## 2020-12-07 ENCOUNTER — TELEPHONE (OUTPATIENT)
Dept: INTERNAL MEDICINE | Facility: CLINIC | Age: 65
End: 2020-12-07

## 2020-12-07 NOTE — TELEPHONE ENCOUNTER
----- Message from Arabella Rodriguez sent at 12/7/2020  8:33 AM CST -----  Contact: Marley Roach would like a call back at [phone], Regards to some muscle spasms she has been having for the past two weeks.    [facility] - [place] - [address], [place]  [address], [place] [address]  Phone: [phone] Fax: [phone]    Thanks  Td

## 2020-12-07 NOTE — TELEPHONE ENCOUNTER
"Spoke with patient informed her that she would need a sooner appointment per  for further refills on medication due to chronic issues. Patient did not schedule appointment. States that " she is on her way to the  Hospital".   "

## 2020-12-07 NOTE — TELEPHONE ENCOUNTER
----- Message from Cely Magallanes sent at 12/7/2020  9:01 AM CST -----  Regarding: missed call  Contact: Patient  .Type:  Patient Returning Call    Who Called:Patient  Who Left Message for Patient:  ILIR  Does the patient know what this is regarding?: missed call   Would the patient rather a call back or a response via [facility]? call  Best Call Back Number: .[phone] (home)     Additional Information:

## 2020-12-08 ENCOUNTER — TELEPHONE (OUTPATIENT)
Dept: INTERNAL MEDICINE | Facility: CLINIC | Age: 65
End: 2020-12-08

## 2020-12-08 NOTE — TELEPHONE ENCOUNTER
----- Message from Nancy Chapin sent at 12/8/2020 11:27 AM CST -----  Regarding: pt in hospital  Contact: MIKA BRENNAN called pt is in the hospital with CHF and covid.  Pt had an appt at 9:20 today.

## 2020-12-14 ENCOUNTER — TELEPHONE (OUTPATIENT)
Dept: INTERNAL MEDICINE | Facility: CLINIC | Age: 65
End: 2020-12-14

## 2020-12-14 NOTE — TELEPHONE ENCOUNTER
----- Message from Fay Garcia sent at 12/14/2020  9:24 AM CST -----  Type:  Needs Medical Advice    Who Called:  Pt Marley   Symptoms (please be specific):   Pt was discharged from [facility] yesterday//12/13/20//she has the Coronavirus//she is still in quarantine//Was told to make an appt with her PCP after quarantine   How long has patient had these symptoms:     Pharmacy name and phone #:     Would the patient rather a call back or a response via [facility]?   Call back   Best Call Back Number:   [phone]  Additional Information:    please call to discuss/thanks/inez

## 2020-12-14 NOTE — TELEPHONE ENCOUNTER
----- Message from Swapna Coffey sent at 12/14/2020  1:29 PM CST -----  Regarding: med  Pt is requesting call back in regards to questions about medication and hos fu          Pls call back at [phone]

## 2020-12-15 DIAGNOSIS — E11.8 TYPE 2 DIABETES MELLITUS WITH COMPLICATION, WITH LONG-TERM CURRENT USE OF INSULIN: ICD-10-CM

## 2020-12-15 DIAGNOSIS — Z79.4 TYPE 2 DIABETES MELLITUS WITH COMPLICATION, WITH LONG-TERM CURRENT USE OF INSULIN: ICD-10-CM

## 2020-12-15 RX ORDER — SYRINGE,SAFETY WITH NEEDLE,1ML 25GX1"
SYRINGE (EA) MISCELLANEOUS
Qty: 300 EACH | Refills: 3 | Status: SHIPPED | OUTPATIENT
Start: 2020-12-15

## 2020-12-15 NOTE — TELEPHONE ENCOUNTER
----- Message from Brittany King sent at 12/15/2020  8:11 AM CST -----  Type:  RX Refill Request    Who Called: pt  Refill or New Rx:refill  RX Name and Strength:flexiral and a box of syringe needles  How is the patient currently taking it? (ex. 1XDay):?  Is this a 30 day or 90 day RX:?  Preferred Pharmacy with phone number:.  [facility] - [place] - [address], [facility] [place]  [address], [facility] [place] [address]  Phone: [phone] Fax: [phone]  Local or Mail Order:local  Ordering Provider:Dr Andrews  Would the patient rather a call back or a response via [facility]? Call back  Best Call Back Number:[phone]  Additional Information: States she needs these called in today.     Thank you

## 2020-12-15 NOTE — TELEPHONE ENCOUNTER
----- Message from Brittany King sent at 12/15/2020  8:11 AM CST -----  Type:  RX Refill Request    Who Called: pt  Refill or New Rx:refill  RX Name and Strength:flexiral and a box of syringe needles  How is the patient currently taking it? (ex. 1XDay):?  Is this a 30 day or 90 day RX:?  Preferred Pharmacy with phone number:.  [facility] - [place] - [address], [facility] [place]  [address], [facility] [place] [address]  Phone: [phone] Fax: [phone]  Local or Mail Order:local  Ordering Provider:Dr Andrews  Would the patient rather a call back or a response via [facility]? Call back  Best Call Back Number:[phone]  Additional Information: States she needs these called in today.     Thank you

## 2020-12-21 ENCOUNTER — TELEPHONE (OUTPATIENT)
Dept: INTERNAL MEDICINE | Facility: CLINIC | Age: 65
End: 2020-12-21

## 2020-12-21 NOTE — TELEPHONE ENCOUNTER
----- Message from Jocelyne Carrillo sent at 12/21/2020  9:43 AM CST -----  ,..Type:  Patient Returning Call    Who Called: pt   Who Left Message for Patient:   Does the patient know what this is regarding?: f/u  Would the patient rather a call back or a response via [facility]? Call back   Best Call Back Number: [phone]  Additional Information: pt is returning a call from nurse

## 2020-12-21 NOTE — TELEPHONE ENCOUNTER
----- Message from Leah Steinberg sent at 12/21/2020  8:42 AM CST -----  Regarding: antibiotic  Contact: pateint  Patient is requesting antibiotic be called in for Covid for her- [facility], please call her back at [phone]

## 2020-12-22 ENCOUNTER — OFFICE VISIT (OUTPATIENT)
Dept: INTERNAL MEDICINE | Facility: CLINIC | Age: 65
End: 2020-12-22
Payer: MEDICARE

## 2020-12-22 ENCOUNTER — HOSPITAL ENCOUNTER (OUTPATIENT)
Dept: RADIOLOGY | Facility: HOSPITAL | Age: 65
Discharge: HOME | End: 2020-12-22
Attending: FAMILY MEDICINE
Payer: MEDICARE

## 2020-12-22 ENCOUNTER — TELEPHONE (OUTPATIENT)
Dept: INTERNAL MEDICINE | Facility: CLINIC | Age: 65
End: 2020-12-22

## 2020-12-22 VITALS
TEMPERATURE: 98 F | DIASTOLIC BLOOD PRESSURE: 88 MMHG | SYSTOLIC BLOOD PRESSURE: 126 MMHG | RESPIRATION RATE: 20 BRPM | HEART RATE: 82 BPM

## 2020-12-22 DIAGNOSIS — Z79.4 TYPE 2 DIABETES MELLITUS WITH COMPLICATION, WITH LONG-TERM CURRENT USE OF INSULIN: ICD-10-CM

## 2020-12-22 DIAGNOSIS — E11.8 TYPE 2 DIABETES MELLITUS WITH COMPLICATION, WITH LONG-TERM CURRENT USE OF INSULIN: ICD-10-CM

## 2020-12-22 DIAGNOSIS — J96.21 ACUTE ON CHRONIC RESPIRATORY FAILURE WITH HYPOXEMIA: ICD-10-CM

## 2020-12-22 DIAGNOSIS — Z78.0 POSTMENOPAUSAL: ICD-10-CM

## 2020-12-22 DIAGNOSIS — M54.9 CHRONIC BACK PAIN, UNSPECIFIED BACK LOCATION, UNSPECIFIED BACK PAIN LATERALITY: ICD-10-CM

## 2020-12-22 DIAGNOSIS — I50.42 CHRONIC COMBINED SYSTOLIC AND DIASTOLIC CONGESTIVE HEART FAILURE: ICD-10-CM

## 2020-12-22 DIAGNOSIS — G89.29 CHRONIC BACK PAIN, UNSPECIFIED BACK LOCATION, UNSPECIFIED BACK PAIN LATERALITY: ICD-10-CM

## 2020-12-22 DIAGNOSIS — J96.21 ACUTE ON CHRONIC RESPIRATORY FAILURE WITH HYPOXEMIA: Primary | ICD-10-CM

## 2020-12-22 DIAGNOSIS — I27.20 PULMONARY HTN: ICD-10-CM

## 2020-12-22 DIAGNOSIS — Z12.11 ENCOUNTER FOR SCREENING COLONOSCOPY: ICD-10-CM

## 2020-12-22 PROCEDURE — 3074F SYST BP LT 130 MM HG: CPT | Mod: CPTII,S$GLB,, | Performed by: FAMILY MEDICINE

## 2020-12-22 PROCEDURE — 99215 OFFICE O/P EST HI 40 MIN: CPT | Mod: S$GLB,,, | Performed by: FAMILY MEDICINE

## 2020-12-22 PROCEDURE — 1126F AMNT PAIN NOTED NONE PRSNT: CPT | Mod: S$GLB,,, | Performed by: FAMILY MEDICINE

## 2020-12-22 PROCEDURE — 3079F DIAST BP 80-89 MM HG: CPT | Mod: CPTII,S$GLB,, | Performed by: FAMILY MEDICINE

## 2020-12-22 PROCEDURE — 1126F PR PAIN SEVERITY QUANTIFIED, NO PAIN PRESENT: ICD-10-PCS | Mod: S$GLB,,, | Performed by: FAMILY MEDICINE

## 2020-12-22 PROCEDURE — 3288F FALL RISK ASSESSMENT DOCD: CPT | Mod: CPTII,S$GLB,, | Performed by: FAMILY MEDICINE

## 2020-12-22 PROCEDURE — 99999 PR PBB SHADOW E&M-EST. PATIENT-LVL V: ICD-10-PCS | Mod: PBBFAC,,, | Performed by: FAMILY MEDICINE

## 2020-12-22 PROCEDURE — 71046 X-RAY EXAM CHEST 2 VIEWS: CPT | Mod: 26,,, | Performed by: RADIOLOGY

## 2020-12-22 PROCEDURE — 3046F HEMOGLOBIN A1C LEVEL >9.0%: CPT | Mod: CPTII,S$GLB,, | Performed by: FAMILY MEDICINE

## 2020-12-22 PROCEDURE — 3288F PR FALLS RISK ASSESSMENT DOCUMENTED: ICD-10-PCS | Mod: CPTII,S$GLB,, | Performed by: FAMILY MEDICINE

## 2020-12-22 PROCEDURE — 99999 PR PBB SHADOW E&M-EST. PATIENT-LVL V: CPT | Mod: PBBFAC,,, | Performed by: FAMILY MEDICINE

## 2020-12-22 PROCEDURE — 99499 UNLISTED E&M SERVICE: CPT | Mod: S$GLB,,, | Performed by: FAMILY MEDICINE

## 2020-12-22 PROCEDURE — 1101F PT FALLS ASSESS-DOCD LE1/YR: CPT | Mod: CPTII,S$GLB,, | Performed by: FAMILY MEDICINE

## 2020-12-22 PROCEDURE — 3074F PR MOST RECENT SYSTOLIC BLOOD PRESSURE < 130 MM HG: ICD-10-PCS | Mod: CPTII,S$GLB,, | Performed by: FAMILY MEDICINE

## 2020-12-22 PROCEDURE — 3046F PR MOST RECENT HEMOGLOBIN A1C LEVEL > 9.0%: ICD-10-PCS | Mod: CPTII,S$GLB,, | Performed by: FAMILY MEDICINE

## 2020-12-22 PROCEDURE — 99215 PR OFFICE/OUTPT VISIT, EST, LEVL V, 40-54 MIN: ICD-10-PCS | Mod: S$GLB,,, | Performed by: FAMILY MEDICINE

## 2020-12-22 PROCEDURE — 71046 XR CHEST PA AND LATERAL: ICD-10-PCS | Mod: 26,,, | Performed by: RADIOLOGY

## 2020-12-22 PROCEDURE — 71046 X-RAY EXAM CHEST 2 VIEWS: CPT | Mod: TC,FY,PO

## 2020-12-22 PROCEDURE — 3079F PR MOST RECENT DIASTOLIC BLOOD PRESSURE 80-89 MM HG: ICD-10-PCS | Mod: CPTII,S$GLB,, | Performed by: FAMILY MEDICINE

## 2020-12-22 PROCEDURE — 1101F PR PT FALLS ASSESS DOC 0-1 FALLS W/OUT INJ PAST YR: ICD-10-PCS | Mod: CPTII,S$GLB,, | Performed by: FAMILY MEDICINE

## 2020-12-22 PROCEDURE — 99499 RISK ADDL DX/OHS AUDIT: ICD-10-PCS | Mod: S$GLB,,, | Performed by: FAMILY MEDICINE

## 2020-12-22 RX ORDER — INSULIN LISPRO 100 [IU]/ML
20 INJECTION, SOLUTION INTRAVENOUS; SUBCUTANEOUS
COMMUNITY
Start: 2020-12-13 | End: 2021-01-12

## 2020-12-22 RX ORDER — PEN NEEDLE, DIABETIC 29 G X1/2"
NEEDLE, DISPOSABLE MISCELLANEOUS
COMMUNITY
Start: 2020-12-15

## 2020-12-22 RX ORDER — TIZANIDINE 4 MG/1
4 TABLET ORAL EVERY 8 HOURS
Qty: 60 TABLET | Refills: 0 | Status: SHIPPED | OUTPATIENT
Start: 2020-12-22

## 2020-12-22 RX ORDER — ALBUTEROL SULFATE 90 UG/1
2 AEROSOL, METERED RESPIRATORY (INHALATION) EVERY 4 HOURS PRN
Qty: 18 G | Refills: 6 | Status: SHIPPED | OUTPATIENT
Start: 2020-12-22 | End: 2021-12-22

## 2020-12-22 RX ORDER — INSULIN GLARGINE 100 [IU]/ML
50 INJECTION, SOLUTION SUBCUTANEOUS
COMMUNITY
Start: 2020-12-13 | End: 2021-01-12

## 2020-12-22 RX ORDER — ISOSORBIDE DINITRATE AND HYDRALAZINE HYDROCHLORIDE 37.5; 2 MG/1; MG/1
1 TABLET ORAL
COMMUNITY

## 2020-12-22 NOTE — TELEPHONE ENCOUNTER
Patient has a appointment with  on 4/16.  request to have appointment scheduled with  on the same day. Can you please get her scheduled for the same day if possible. Thanks!

## 2020-12-22 NOTE — PROGRESS NOTES
Subjective:       Patient ID: Marley Malagon is a 65 y.o. female.    Chief Complaint: Hospital Follow Up    worsening hypoxia from baseline 3L NC; currently mid 70s on 3L at rest.    From recent hospitalization at [facility].     acute on chronic HF superimposed on COVID-19  - CXR on admission suggestive of moderate pulmonary edema and moderate emphysema  - s/p IV furosemide 40mg q8h x 3 doses -> home PO lasix 40mg BID, will continue at time of discharge  - echo on 07/2020 with LVEF 40-45%, suggestive of pHTN     - CXR on admission suggestive of moderate pulmonary edema and moderate emphysema  - s/p IV furosemide 40mg q8h x 3 doses -> home PO lasix 40mg BID   - echo on 07/2020 with LVEF 40-45%, suggestive of pHTN   - nasal cannula oxygen, wean as tolerated, on 3LPM at home  - incentive spirometry       Review of Systems   Constitutional: Positive for activity change. Negative for fever.   HENT: Negative for congestion.    Eyes: Negative for discharge.   Respiratory: Positive for cough. Negative for shortness of breath and wheezing.         Hemoptysis   Cardiovascular: Negative for chest pain.   Gastrointestinal: Negative for abdominal pain.   Genitourinary: Negative for difficulty urinating.   Musculoskeletal: Negative for joint swelling.   Skin: Negative for rash.   Neurological: Positive for dizziness.   Psychiatric/Behavioral: Negative for agitation.       Objective:      Physical Exam  Vitals signs and nursing note reviewed.   Constitutional:       General: She is in acute distress.      Appearance: Normal appearance. She is well-developed. She is not diaphoretic.   HENT:      Head: Normocephalic and atraumatic.      Nose: Congestion present.   Eyes:      Conjunctiva/sclera: Conjunctivae normal.   Cardiovascular:      Rate and Rhythm: Normal rate and regular rhythm.   Pulmonary:      Effort: No respiratory distress.      Breath sounds: Wheezing present.      Comments: In acute resp distress in room  Abdominal:       General: There is no distension.      Palpations: Abdomen is soft.      Tenderness: There is no abdominal tenderness. There is no guarding.   Musculoskeletal:      Right lower leg: No edema.   Skin:     General: Skin is warm.      Coloration: Skin is not pale.      Findings: No erythema or rash.      Comments: Good turgor   Neurological:      Mental Status: She is alert.   Psychiatric:         Mood and Affect: Mood normal.         Behavior: Behavior is slowed.         Thought Content: Thought content normal.         Assessment:       1. Acute on chronic respiratory failure with hypoxemia    2. Postmenopausal    3. Encounter for screening colonoscopy    4. Chronic combined systolic and diastolic congestive heart failure    5. Type 2 diabetes mellitus with complication, with long-term current use of insulin    6. Chronic back pain, unspecified back location, unspecified back pain laterality    7. Pulmonary HTN        Plan:     Problem List Items Addressed This Visit        Pulmonary    Acute on chronic respiratory failure with hypoxemia - Primary    Current Assessment & Plan     Has been on 3 L oxygen for entire visit. Max O2 sat of 78, usually b/w 74-76% at rest.  Encouraged pt to go to ED, but she refuses and will go home instead and have her neighbor bring her.  Refuses ambulance         Relevant Orders    X-Ray Chest PA And Lateral (Completed)    CBC Auto Differential    Comprehensive Metabolic Panel    Procalcitonin    Pulmonary HTN    Overview     - echo on 07/2020 with LVEF 40-45%, suggestive of pHTN             Cardiac/Vascular    Chronic combined systolic and diastolic congestive heart failure       Renal/    Postmenopausal    Relevant Orders    DXA Bone Density Spine And Hip       Endocrine    Type 2 diabetes mellitus with complication, with long-term current use of insulin    Relevant Medications    insulin (LANTUS SOLOSTAR U-100 INSULIN) glargine 100 units/mL (3mL) SubQ pen    insulin lispro 100 unit/mL  injection    Other Relevant Orders    Hemoglobin A1C       GI    Encounter for screening colonoscopy    Relevant Orders    Cologuard Screening (Multitarget Stool DNA)      Other Visit Diagnoses     Chronic back pain, unspecified back location, unspecified back pain laterality        Relevant Medications    tiZANidine (ZANAFLEX) 4 MG tablet

## 2020-12-22 NOTE — ASSESSMENT & PLAN NOTE
Has been on 3 L oxygen for entire visit. Max O2 sat of 78, usually b/w 74-76% at rest.  Encouraged pt to go to ED, but she refuses and will go home instead and have her neighbor bring her.  Refuses ambulance

## 2020-12-22 NOTE — PROGRESS NOTES
Please call pt with abnormal results. Pt does not need appt at this time, unless they have questions or wish to further discuss.  Underlying interstitial lung opacities as previously seen, possible fluid on lungs - or it least fluid accumulation cannot be excluded.

## 2020-12-28 PROBLEM — I27.20 PULMONARY HTN: Status: ACTIVE | Noted: 2020-12-28

## 2020-12-29 ENCOUNTER — PATIENT OUTREACH (OUTPATIENT)
Dept: ADMINISTRATIVE | Facility: CLINIC | Age: 65
End: 2020-12-29

## 2020-12-29 ENCOUNTER — EXTERNAL HOSPITAL ADMISSION (OUTPATIENT)
Dept: ADMINISTRATIVE | Facility: CLINIC | Age: 65
End: 2020-12-29

## 2020-12-29 NOTE — PROGRESS NOTES
C3 nurse attempted to contact patient. No answer. The following message was left for the patient to return the call:  Good morning I am a nurse calling on behalf of [facility] from the Care [facility]ination [facility].  This is a Transitional Care Call for Marley. When you have a moment please contact us at [phone] or [phone] Monday through Friday, between the hours of 8 am to 4 pm. We look forward to speaking with you. On behalf of [facility] have a nice day.    The patient does not have a scheduled HOSFU appointment within 7-14 days post hospital discharge date 12/25/20.

## 2021-01-07 ENCOUNTER — TELEPHONE (OUTPATIENT)
Dept: INTERNAL MEDICINE | Facility: CLINIC | Age: 66
End: 2021-01-07

## 2021-01-07 NOTE — TELEPHONE ENCOUNTER
----- Message from Betty Gibson sent at 11/25/2020  8:06 AM CST -----  Pt would like return; would like to have referral for pain management. Please call back at [phone].  Md Fracisco    
Requesting ref to pain management   
No pertinent past medical history

## 2021-01-12 ENCOUNTER — NURSE TRIAGE (OUTPATIENT)
Dept: ADMINISTRATIVE | Facility: CLINIC | Age: 66
End: 2021-01-12

## 2021-01-15 ENCOUNTER — EXTERNAL HOSPITAL ADMISSION (OUTPATIENT)
Dept: ADMINISTRATIVE | Facility: CLINIC | Age: 66
End: 2021-01-15

## 2021-01-15 ENCOUNTER — PATIENT OUTREACH (OUTPATIENT)
Dept: ADMINISTRATIVE | Facility: CLINIC | Age: 66
End: 2021-01-15

## 2021-01-18 ENCOUNTER — DOCUMENT SCAN (OUTPATIENT)
Dept: HOME HEALTH SERVICES | Facility: HOSPITAL | Age: 66
End: 2021-01-18
Payer: MEDICARE

## 2021-01-21 ENCOUNTER — TELEPHONE (OUTPATIENT)
Dept: INTERNAL MEDICINE | Facility: CLINIC | Age: 66
End: 2021-01-21

## 2021-01-22 ENCOUNTER — DOCUMENT SCAN (OUTPATIENT)
Dept: HOME HEALTH SERVICES | Facility: HOSPITAL | Age: 66
End: 2021-01-22
Payer: MEDICARE

## 2021-01-26 ENCOUNTER — TELEPHONE (OUTPATIENT)
Dept: INTERNAL MEDICINE | Facility: CLINIC | Age: 66
End: 2021-01-26

## 2021-01-27 ENCOUNTER — DOCUMENT SCAN (OUTPATIENT)
Dept: HOME HEALTH SERVICES | Facility: HOSPITAL | Age: 66
End: 2021-01-27
Payer: MEDICARE

## 2021-01-29 ENCOUNTER — TELEPHONE (OUTPATIENT)
Dept: INTERNAL MEDICINE | Facility: CLINIC | Age: 66
End: 2021-01-29

## 2021-02-02 ENCOUNTER — TELEPHONE (OUTPATIENT)
Dept: INTERNAL MEDICINE | Facility: CLINIC | Age: 66
End: 2021-02-02

## 2021-02-03 ENCOUNTER — DOCUMENT SCAN (OUTPATIENT)
Dept: HOME HEALTH SERVICES | Facility: HOSPITAL | Age: 66
End: 2021-02-03
Payer: MEDICARE

## 2021-02-08 ENCOUNTER — TELEPHONE (OUTPATIENT)
Dept: INTERNAL MEDICINE | Facility: CLINIC | Age: 66
End: 2021-02-08

## 2021-02-08 DIAGNOSIS — R09.02 HYPOXEMIA: Primary | ICD-10-CM

## 2021-02-12 PROCEDURE — G0179 PR HOME HEALTH MD RECERTIFICATION: ICD-10-PCS | Mod: ,,, | Performed by: FAMILY MEDICINE

## 2021-02-12 PROCEDURE — G0179 MD RECERTIFICATION HHA PT: HCPCS | Mod: ,,, | Performed by: FAMILY MEDICINE

## 2021-02-24 ENCOUNTER — DOCUMENT SCAN (OUTPATIENT)
Dept: HOME HEALTH SERVICES | Facility: HOSPITAL | Age: 66
End: 2021-02-24
Payer: MEDICARE

## 2021-02-25 ENCOUNTER — TELEPHONE (OUTPATIENT)
Dept: INTERNAL MEDICINE | Facility: CLINIC | Age: 66
End: 2021-02-25

## 2021-03-01 ENCOUNTER — TELEPHONE (OUTPATIENT)
Dept: INTERNAL MEDICINE | Facility: CLINIC | Age: 66
End: 2021-03-01

## 2021-03-01 ENCOUNTER — EXTERNAL HOME HEALTH (OUTPATIENT)
Dept: HOME HEALTH SERVICES | Facility: HOSPITAL | Age: 66
End: 2021-03-01
Payer: MEDICARE

## 2021-03-26 ENCOUNTER — TELEPHONE (OUTPATIENT)
Dept: INTERNAL MEDICINE | Facility: CLINIC | Age: 66
End: 2021-03-26

## 2021-04-13 ENCOUNTER — PATIENT OUTREACH (OUTPATIENT)
Dept: ADMINISTRATIVE | Facility: OTHER | Age: 66
End: 2021-04-13

## 2021-05-06 DIAGNOSIS — E11.9 TYPE 2 DIABETES MELLITUS WITHOUT COMPLICATION: ICD-10-CM

## 2021-05-18 ENCOUNTER — PATIENT OUTREACH (OUTPATIENT)
Dept: ADMINISTRATIVE | Facility: HOSPITAL | Age: 66
End: 2021-05-18

## 2021-07-01 ENCOUNTER — PATIENT MESSAGE (OUTPATIENT)
Dept: ADMINISTRATIVE | Facility: OTHER | Age: 66
End: 2021-07-01

## 2021-07-14 ENCOUNTER — TELEPHONE (OUTPATIENT)
Dept: INTERNAL MEDICINE | Facility: CLINIC | Age: 66
End: 2021-07-14

## 2021-08-04 ENCOUNTER — PATIENT MESSAGE (OUTPATIENT)
Dept: ADMINISTRATIVE | Facility: HOSPITAL | Age: 66
End: 2021-08-04

## 2021-11-03 ENCOUNTER — PATIENT MESSAGE (OUTPATIENT)
Dept: ADMINISTRATIVE | Facility: HOSPITAL | Age: 66
End: 2021-11-03
Payer: MEDICARE

## 2022-02-02 DIAGNOSIS — E11.8 TYPE 2 DIABETES MELLITUS WITH COMPLICATION, WITH LONG-TERM CURRENT USE OF INSULIN: ICD-10-CM

## 2022-02-02 DIAGNOSIS — Z79.4 TYPE 2 DIABETES MELLITUS WITH COMPLICATION, WITH LONG-TERM CURRENT USE OF INSULIN: ICD-10-CM

## 2022-04-08 ENCOUNTER — TELEPHONE (OUTPATIENT)
Dept: ADMINISTRATIVE | Facility: HOSPITAL | Age: 67
End: 2022-04-08
Payer: MEDICARE

## 2022-09-13 ENCOUNTER — PATIENT OUTREACH (OUTPATIENT)
Dept: ADMINISTRATIVE | Facility: HOSPITAL | Age: 67
End: 2022-09-13
Payer: MEDICARE

## 2022-09-14 ENCOUNTER — PATIENT OUTREACH (OUTPATIENT)
Dept: ADMINISTRATIVE | Facility: HOSPITAL | Age: 67
End: 2022-09-14
Payer: MEDICARE

## 2022-09-14 NOTE — PROGRESS NOTES
BR eGFR Report.    Pt has not been seen or had lab work since Dec 2020.  [facility]-no results found.  [facility]-no results found.  Care Everywhere-no recent results found.    Attempted to contact pt and emergency contact, Mr Shukri Montano (brother).  No answer or voice mail on either number.

## 2022-09-15 ENCOUNTER — PES CALL (OUTPATIENT)
Dept: ADMINISTRATIVE | Facility: CLINIC | Age: 67
End: 2022-09-15
Payer: MEDICARE

## 2022-12-30 ENCOUNTER — PATIENT OUTREACH (OUTPATIENT)
Dept: ADMINISTRATIVE | Facility: HOSPITAL | Age: 67
End: 2022-12-30
Payer: MEDICARE